# Patient Record
Sex: MALE | Race: WHITE | Employment: OTHER | URBAN - NONMETROPOLITAN AREA
[De-identification: names, ages, dates, MRNs, and addresses within clinical notes are randomized per-mention and may not be internally consistent; named-entity substitution may affect disease eponyms.]

---

## 2017-10-18 ENCOUNTER — HOSPITAL ENCOUNTER (EMERGENCY)
Age: 62
Discharge: HOME | End: 2017-10-18
Attending: EMERGENCY MEDICINE
Payer: MEDICARE

## 2017-10-18 VITALS
HEART RATE: 71 BPM | RESPIRATION RATE: 18 BRPM | BODY MASS INDEX: 24.33 KG/M2 | TEMPERATURE: 98.8 F | OXYGEN SATURATION: 97 % | SYSTOLIC BLOOD PRESSURE: 182 MMHG | WEIGHT: 160 LBS | DIASTOLIC BLOOD PRESSURE: 89 MMHG

## 2017-10-18 DIAGNOSIS — M54.50 ACUTE EXACERBATION OF CHRONIC LOW BACK PAIN: Primary | ICD-10-CM

## 2017-10-18 DIAGNOSIS — G89.29 ACUTE EXACERBATION OF CHRONIC LOW BACK PAIN: Primary | ICD-10-CM

## 2017-10-18 PROCEDURE — 96372 THER/PROPH/DIAG INJ SC/IM: CPT

## 2017-10-18 PROCEDURE — 6360000002 HC RX W HCPCS: Performed by: EMERGENCY MEDICINE

## 2017-10-18 PROCEDURE — 99282 EMERGENCY DEPT VISIT SF MDM: CPT

## 2017-10-18 RX ORDER — IBUPROFEN 200 MG
200 TABLET ORAL EVERY 8 HOURS PRN
Qty: 30 TABLET | Refills: 0 | Status: SHIPPED | OUTPATIENT
Start: 2017-10-18 | End: 2022-09-04

## 2017-10-18 RX ORDER — CYCLOBENZAPRINE HCL 10 MG
10 TABLET ORAL 3 TIMES DAILY PRN
Qty: 15 TABLET | Refills: 0 | Status: SHIPPED | OUTPATIENT
Start: 2017-10-18 | End: 2017-10-28

## 2017-10-18 RX ORDER — ORPHENADRINE CITRATE 30 MG/ML
60 INJECTION INTRAMUSCULAR; INTRAVENOUS ONCE
Status: COMPLETED | OUTPATIENT
Start: 2017-10-18 | End: 2017-10-18

## 2017-10-18 RX ORDER — KETOROLAC TROMETHAMINE 30 MG/ML
30 INJECTION, SOLUTION INTRAMUSCULAR; INTRAVENOUS ONCE
Status: COMPLETED | OUTPATIENT
Start: 2017-10-18 | End: 2017-10-18

## 2017-10-18 RX ADMIN — ORPHENADRINE CITRATE 60 MG: 30 INJECTION INTRAMUSCULAR; INTRAVENOUS at 17:37

## 2017-10-18 RX ADMIN — KETOROLAC TROMETHAMINE 30 MG: 30 INJECTION, SOLUTION INTRAMUSCULAR at 17:37

## 2017-10-18 ASSESSMENT — PAIN DESCRIPTION - ORIENTATION
ORIENTATION: LOWER
ORIENTATION: LOWER

## 2017-10-18 ASSESSMENT — ENCOUNTER SYMPTOMS
ABDOMINAL PAIN: 0
BACK PAIN: 1
VOMITING: 0
SHORTNESS OF BREATH: 0
NAUSEA: 0

## 2017-10-18 ASSESSMENT — PAIN DESCRIPTION - PROGRESSION
CLINICAL_PROGRESSION: GRADUALLY IMPROVING
CLINICAL_PROGRESSION: RAPIDLY WORSENING

## 2017-10-18 ASSESSMENT — PAIN - FUNCTIONAL ASSESSMENT: PAIN_FUNCTIONAL_ASSESSMENT: 0-10

## 2017-10-18 ASSESSMENT — PAIN SCALES - GENERAL
PAINLEVEL_OUTOF10: 10
PAINLEVEL_OUTOF10: 8

## 2017-10-18 ASSESSMENT — PAIN DESCRIPTION - ONSET
ONSET: ON-GOING
ONSET: ON-GOING

## 2017-10-18 ASSESSMENT — PAIN DESCRIPTION - LOCATION
LOCATION: BACK
LOCATION: BACK

## 2017-10-18 ASSESSMENT — PAIN DESCRIPTION - FREQUENCY
FREQUENCY: CONTINUOUS
FREQUENCY: CONTINUOUS

## 2017-10-18 ASSESSMENT — PAIN DESCRIPTION - PAIN TYPE
TYPE: CHRONIC PAIN
TYPE: CHRONIC PAIN

## 2017-10-18 ASSESSMENT — PAIN DESCRIPTION - DESCRIPTORS
DESCRIPTORS: STABBING
DESCRIPTORS: STABBING

## 2017-10-18 NOTE — ED PROVIDER NOTES
[address]  Peter Astudillo.  Phone: [phone]  eMERGENCY dEPARTMENT eNCOUnter      Pt Name: Milind Rodríguez  MRN: [id number]  Briellegfmarie 1955  Date of evaluation: 10/18/17      CHIEF COMPLAINT       Chief Complaint   Patient presents with    Back Pain     chronic lower back pain, was working in yard that made it worse         HISTORY OF PRESENT ILLNESS    Milind Rodríguez is a 63 y.o. male who presents Today with exacerbation of chronic lower back pain. The patient states that his pain waxes and wanes. He denies any recent trauma however in the 1990s he did have a lifting injury and most of his back pain is that have been covered by workmen's comp. He states that he has seen multiple spinal surgeons and he finally found one who is agreeable to operate and that his workmen's comp denied coverage. He also states that he is having difficulty with his . It's like a ghost reports that he has numbness in the right foot but this is been there for several months even before he saw her most recent surgeon. He denies chest pain or shortness of breath. No associated abdominal pain. Denies any incontinence of bowel or bladder function or saddle paresthesia however he states that he has difficulty twisting. Denies a history of IV drug use or any recent tattoos. REVIEW OF SYSTEMS     Review of Systems   Constitutional: Negative for fever. HENT: Negative for congestion. Respiratory: Negative for shortness of breath. Cardiovascular: Negative for chest pain. Gastrointestinal: Negative for abdominal pain, nausea and vomiting. Genitourinary: Negative for difficulty urinating. Musculoskeletal: Positive for back pain. Skin: Negative for rash. Neurological: Positive for numbness. Negative for weakness. Psychiatric/Behavioral: Negative for confusion. All other systems reviewed and are negative.         PAST MEDICAL HISTORY    has a past medical history of Asthma; Chronic back pain; DDD (degenerative disc disease); Left knee pain; Lumbago; Lumbar disc displacement without myelopathy; and Thoracic or lumbosacral neuritis or radiculitis, unspecified. SURGICAL HISTORY      has a past surgical history that includes Total knee arthroplasty (Left, 2009) and Vasectomy. CURRENT MEDICATIONS       Discharge Medication List as of 10/18/2017  5:55 PM      CONTINUE these medications which have NOT CHANGED    Details   tiotropium (SPIRIVA) 18 MCG inhalation capsule Inhale 18 mcg into the lungs daily. budesonide-formoterol (SYMBICORT) 80-4.5 MCG/ACT AERO Inhale 2 puffs into the lungs 2 times daily. cephALEXin (KEFLEX) 500 MG capsule Historical Med      sulfamethoxazole-trimethoprim (BACTRIM DS) 800-160 MG per tablet Historical Med      clonazePAM (KLONOPIN) 0.5 MG tablet Take 1 tablet by mouth 2 times daily as needed, Disp-30 tablet, R-0      nicotine (NICODERM CQ) 21 MG/24HR Place 1 patch onto the skin, Disp-30 patch, R-0      citalopram (CELEXA) 10 MG tablet Take 1 tablet by mouth daily, Disp-30 tablet, R-0      atorvastatin (LIPITOR) 40 MG tablet Take 1 tablet by mouth nightly, Disp-30 tablet, R-0      gabapentin (NEURONTIN) 300 MG capsule Take 1 capsule by mouth 3 times daily. , Disp-90 capsule, R-3      albuterol (PROVENTIL) (2.5 MG/3ML) 0.083% nebulizer solution Take 2.5 mg by nebulization every 6 hours as needed for Wheezing. ALLERGIES     is allergic to zipsor [diclofenac potassium]. FAMILY HISTORY     has no family status information on file. family history is not on file. SOCIAL HISTORY      reports that he quit smoking about 3 years ago. His smoking use included Cigarettes. He has never used smokeless tobacco. He reports that he does not drink alcohol or use drugs. PHYSICAL EXAM     INITIAL VITALS:  weight is 160 lb (72.6 kg). His tympanic temperature is 98.8 °F (37.1 °C).  His blood pressure is 182/89 (abnormal) and his pulse is disc protrusion, and right paramedian L4-L5 disc protrusion with associated synovial cyst or fluid in the right facet ligamentous. As the patient is 63years old and has mild tenderness in the axilla was recommended. I explained) to look for fracture or bony abnormality and that MRI is not very sensitive to look at the bones. The risks and benefits were discussed the patient declined the x-ray. He was agreeable to injections of Toradol and Norflex. No history of diabetes or kidney problems. The patient was directed to continue over-the-counter ibuprofen unless otherwise started by his doctor and was educated on the warning signs which return to the emergency department. DIAGNOSTIC RESULTS     EKG: All EKG's are interpreted by the Emergency Department Physician who either signs or Co-signs this chart in the absence of a cardiologist.        RADIOLOGY:   I directly visualized the following plain film images and reviewed the radiologist interpretations of radiologic studies:    No orders to display        No results found. LABS:  No results found for this visit on 10/18/17. EMERGENCY DEPARTMENT COURSE:   Vitals:    Vitals:    10/18/17 1701 10/18/17 1802   BP: (!) 157/91 (!) 182/89   Pulse: 71    Resp: 18    Temp: 98.8 °F (37.1 °C)    TempSrc: Tympanic    SpO2: 97%    Weight: 160 lb (72.6 kg)      -------------------------  BP: (!) 182/89, Temp: 98.8 °F (37.1 °C), Pulse: 71, Resp: 18      CONSULTS:  None    PROCEDURES:  None    FINAL IMPRESSION      1.  Acute exacerbation of chronic low back pain          DISPOSITION/PLAN   DISPOSITION Decision to Discharge    PATIENT REFERRED TO:  Frank Nieto DO  [address] [facility] [id number] [phone]    In 2 days        DISCHARGE MEDICATIONS:  Discharge Medication List as of 10/18/2017  5:55 PM      START taking these medications    Details   cyclobenzaprine (FLEXERIL) 10 MG tablet Take 1 tablet by mouth 3 times daily as needed for Muscle spasms, Disp-15 tablet, R-0Print             (Please note that portions of this note were completed with a voice recognition program.  Efforts were made to edit the dictations but occasionally words are mis-transcribed.)    Vaughn Robertson MD, [address] [facility],[address]  Attending Emergency Medicine Physician        Vaughn Robertson MD  10/18/17 9838

## 2019-08-28 ENCOUNTER — HOSPITAL ENCOUNTER (EMERGENCY)
Age: 64
Discharge: HOME | End: 2019-08-29
Attending: EMERGENCY MEDICINE
Payer: MEDICARE

## 2019-08-28 ENCOUNTER — APPOINTMENT (OUTPATIENT)
Dept: CT IMAGING | Age: 64
End: 2019-08-28
Payer: MEDICARE

## 2019-08-28 DIAGNOSIS — T68.XXXA HYPOTHERMIA, INITIAL ENCOUNTER: ICD-10-CM

## 2019-08-28 DIAGNOSIS — R41.82 ALTERED MENTAL STATUS, UNSPECIFIED ALTERED MENTAL STATUS TYPE: Primary | ICD-10-CM

## 2019-08-28 DIAGNOSIS — T40.601A NARCOTIC OVERDOSE, ACCIDENTAL OR UNINTENTIONAL, INITIAL ENCOUNTER (HCC): ICD-10-CM

## 2019-08-28 DIAGNOSIS — R09.02 HYPOXIA: ICD-10-CM

## 2019-08-28 LAB
ABSOLUTE EOS #: 0.1 K/UL (ref 0–0.4)
ABSOLUTE IMMATURE GRANULOCYTE: ABNORMAL K/UL (ref 0–0.3)
ABSOLUTE LYMPH #: 1.4 K/UL (ref 1–4.8)
ABSOLUTE MONO #: 1 K/UL (ref 0.1–1.2)
ALBUMIN SERPL-MCNC: 5 G/DL (ref 3.5–5.2)
ALBUMIN/GLOBULIN RATIO: 1.6 (ref 1–2.5)
ALP BLD-CCNC: 133 U/L (ref 40–129)
ALT SERPL-CCNC: 27 U/L (ref 5–41)
ANION GAP SERPL CALCULATED.3IONS-SCNC: 11 MMOL/L (ref 9–17)
AST SERPL-CCNC: 20 U/L
BASOPHILS # BLD: 0 % (ref 0–2)
BASOPHILS ABSOLUTE: 0 K/UL (ref 0–0.2)
BILIRUB SERPL-MCNC: 0.57 MG/DL (ref 0.3–1.2)
BUN BLDV-MCNC: 19 MG/DL (ref 8–23)
BUN/CREAT BLD: 18 (ref 9–20)
CALCIUM SERPL-MCNC: 9.8 MG/DL (ref 8.6–10.4)
CHLORIDE BLD-SCNC: 97 MMOL/L (ref 98–107)
CO2: 31 MMOL/L (ref 20–31)
CREAT SERPL-MCNC: 1.07 MG/DL (ref 0.7–1.2)
DIFFERENTIAL TYPE: ABNORMAL
EOSINOPHILS RELATIVE PERCENT: 1 % (ref 1–8)
GFR AFRICAN AMERICAN: >60 ML/MIN
GFR NON-AFRICAN AMERICAN: >60 ML/MIN
GFR SERPL CREATININE-BSD FRML MDRD: ABNORMAL ML/MIN/{1.73_M2}
GFR SERPL CREATININE-BSD FRML MDRD: ABNORMAL ML/MIN/{1.73_M2}
GLUCOSE BLD-MCNC: 248 MG/DL (ref 70–99)
HCT VFR BLD CALC: 53.1 % (ref 41–53)
HEMOGLOBIN: 17.6 G/DL (ref 13.5–17.5)
IMMATURE GRANULOCYTES: ABNORMAL %
LACTIC ACID: 1.3 MMOL/L (ref 0.5–2.2)
LIPASE: 29 U/L (ref 13–60)
LYMPHOCYTES # BLD: 11 % (ref 15–43)
MCH RBC QN AUTO: 30.2 PG (ref 26–34)
MCHC RBC AUTO-ENTMCNC: 33.2 G/DL (ref 31–37)
MCV RBC AUTO: 90.8 FL (ref 80–100)
MONOCYTES # BLD: 8 % (ref 6–14)
NRBC AUTOMATED: ABNORMAL PER 100 WBC
PDW BLD-RTO: 13.6 % (ref 11–14.5)
PLATELET # BLD: 263 K/UL (ref 140–450)
PLATELET ESTIMATE: ABNORMAL
PMV BLD AUTO: 8.6 FL (ref 6–12)
POTASSIUM SERPL-SCNC: 4 MMOL/L (ref 3.7–5.3)
RBC # BLD: 5.84 M/UL (ref 4.5–5.9)
RBC # BLD: ABNORMAL 10*6/UL
SEG NEUTROPHILS: 80 % (ref 44–74)
SEGMENTED NEUTROPHILS ABSOLUTE COUNT: 10.4 K/UL (ref 1.8–7.7)
SODIUM BLD-SCNC: 139 MMOL/L (ref 135–144)
TOTAL PROTEIN: 8.2 G/DL (ref 6.4–8.3)
TROPONIN INTERP: NORMAL
TROPONIN T: NORMAL NG/ML
TROPONIN, HIGH SENSITIVITY: 14 NG/L (ref 0–22)
WBC # BLD: 12.9 K/UL (ref 3.5–11)
WBC # BLD: ABNORMAL 10*3/UL

## 2019-08-28 PROCEDURE — 83690 ASSAY OF LIPASE: CPT

## 2019-08-28 PROCEDURE — 6360000004 HC RX CONTRAST MEDICATION: Performed by: EMERGENCY MEDICINE

## 2019-08-28 PROCEDURE — 2580000003 HC RX 258: Performed by: EMERGENCY MEDICINE

## 2019-08-28 PROCEDURE — 96374 THER/PROPH/DIAG INJ IV PUSH: CPT

## 2019-08-28 PROCEDURE — 96375 TX/PRO/DX INJ NEW DRUG ADDON: CPT

## 2019-08-28 PROCEDURE — 99284 EMERGENCY DEPT VISIT MOD MDM: CPT

## 2019-08-28 PROCEDURE — 36415 COLL VENOUS BLD VENIPUNCTURE: CPT

## 2019-08-28 PROCEDURE — 71275 CT ANGIOGRAPHY CHEST: CPT

## 2019-08-28 PROCEDURE — 83605 ASSAY OF LACTIC ACID: CPT

## 2019-08-28 PROCEDURE — 80053 COMPREHEN METABOLIC PANEL: CPT

## 2019-08-28 PROCEDURE — 82140 ASSAY OF AMMONIA: CPT

## 2019-08-28 PROCEDURE — 80307 DRUG TEST PRSMV CHEM ANLYZR: CPT

## 2019-08-28 PROCEDURE — G0480 DRUG TEST DEF 1-7 CLASSES: HCPCS

## 2019-08-28 PROCEDURE — 6360000002 HC RX W HCPCS: Performed by: EMERGENCY MEDICINE

## 2019-08-28 PROCEDURE — 93005 ELECTROCARDIOGRAM TRACING: CPT | Performed by: EMERGENCY MEDICINE

## 2019-08-28 PROCEDURE — 84484 ASSAY OF TROPONIN QUANT: CPT

## 2019-08-28 PROCEDURE — 85025 COMPLETE CBC W/AUTO DIFF WBC: CPT

## 2019-08-28 RX ORDER — FENTANYL CITRATE 50 UG/ML
25 INJECTION, SOLUTION INTRAMUSCULAR; INTRAVENOUS ONCE
Status: DISCONTINUED | OUTPATIENT
Start: 2019-08-28 | End: 2019-08-29 | Stop reason: HOSPADM

## 2019-08-28 RX ORDER — ONDANSETRON 2 MG/ML
4 INJECTION INTRAMUSCULAR; INTRAVENOUS ONCE
Status: COMPLETED | OUTPATIENT
Start: 2019-08-28 | End: 2019-08-28

## 2019-08-28 RX ORDER — 0.9 % SODIUM CHLORIDE 0.9 %
1000 INTRAVENOUS SOLUTION INTRAVENOUS ONCE
Status: COMPLETED | OUTPATIENT
Start: 2019-08-28 | End: 2019-08-28

## 2019-08-28 RX ORDER — NALOXONE HYDROCHLORIDE 1 MG/ML
0.4 INJECTION INTRAMUSCULAR; INTRAVENOUS; SUBCUTANEOUS ONCE
Status: COMPLETED | OUTPATIENT
Start: 2019-08-28 | End: 2019-08-28

## 2019-08-28 RX ADMIN — NALOXONE HYDROCHLORIDE 0.4 MG: 1 INJECTION PARENTERAL at 23:14

## 2019-08-28 RX ADMIN — IOPAMIDOL 100 ML: 755 INJECTION, SOLUTION INTRAVENOUS at 22:34

## 2019-08-28 RX ADMIN — SODIUM CHLORIDE 1000 ML: 9 INJECTION, SOLUTION INTRAVENOUS at 22:05

## 2019-08-28 RX ADMIN — ONDANSETRON 4 MG: 2 INJECTION INTRAMUSCULAR; INTRAVENOUS at 22:05

## 2019-08-28 ASSESSMENT — PAIN DESCRIPTION - PAIN TYPE: TYPE: ACUTE PAIN

## 2019-08-28 ASSESSMENT — PAIN DESCRIPTION - PROGRESSION: CLINICAL_PROGRESSION: NOT CHANGED

## 2019-08-28 ASSESSMENT — PAIN DESCRIPTION - DESCRIPTORS: DESCRIPTORS: ACHING

## 2019-08-28 ASSESSMENT — PAIN DESCRIPTION - ONSET: ONSET: ON-GOING

## 2019-08-28 ASSESSMENT — PAIN DESCRIPTION - LOCATION: LOCATION: ABDOMEN

## 2019-08-28 ASSESSMENT — PAIN SCALES - GENERAL
PAINLEVEL_OUTOF10: 5
PAINLEVEL_OUTOF10: 5

## 2019-08-28 ASSESSMENT — PAIN DESCRIPTION - FREQUENCY: FREQUENCY: CONTINUOUS

## 2019-08-28 ASSESSMENT — PAIN DESCRIPTION - ORIENTATION: ORIENTATION: RIGHT;LEFT

## 2019-08-28 ASSESSMENT — PAIN - FUNCTIONAL ASSESSMENT: PAIN_FUNCTIONAL_ASSESSMENT: PREVENTS OR INTERFERES SOME ACTIVE ACTIVITIES AND ADLS

## 2019-08-29 VITALS
BODY MASS INDEX: 24.33 KG/M2 | OXYGEN SATURATION: 88 % | RESPIRATION RATE: 12 BRPM | SYSTOLIC BLOOD PRESSURE: 151 MMHG | WEIGHT: 160 LBS | HEART RATE: 75 BPM | TEMPERATURE: 95 F | DIASTOLIC BLOOD PRESSURE: 87 MMHG

## 2019-08-29 LAB
ACETAMINOPHEN LEVEL: <5 UG/ML (ref 10–30)
AMMONIA: 66 UMOL/L (ref 16–60)
EKG ATRIAL RATE: 66 BPM
EKG P AXIS: 12 DEGREES
EKG P-R INTERVAL: 172 MS
EKG Q-T INTERVAL: 430 MS
EKG QRS DURATION: 102 MS
EKG QTC CALCULATION (BAZETT): 450 MS
EKG R AXIS: -21 DEGREES
EKG T AXIS: 37 DEGREES
EKG VENTRICULAR RATE: 66 BPM
ETHANOL PERCENT: ABNORMAL %
ETHANOL: <10 MG/DL
LACTIC ACID: 2.4 MMOL/L (ref 0.5–2.2)
SALICYLATE LEVEL: <1 MG/DL (ref 3–10)
TOXIC TRICYCLIC SC,BLOOD: NEGATIVE
TROPONIN INTERP: NORMAL
TROPONIN T: NORMAL NG/ML
TROPONIN, HIGH SENSITIVITY: 12 NG/L (ref 0–22)

## 2019-08-29 PROCEDURE — 96376 TX/PRO/DX INJ SAME DRUG ADON: CPT

## 2019-08-29 PROCEDURE — 6360000002 HC RX W HCPCS: Performed by: EMERGENCY MEDICINE

## 2019-08-29 RX ORDER — NALOXONE HYDROCHLORIDE 1 MG/ML
0.4 INJECTION INTRAMUSCULAR; INTRAVENOUS; SUBCUTANEOUS ONCE
Status: COMPLETED | OUTPATIENT
Start: 2019-08-29 | End: 2019-08-29

## 2019-08-29 RX ADMIN — NALOXONE HYDROCHLORIDE 0.4 MG: 1 INJECTION PARENTERAL at 01:28

## 2019-08-29 NOTE — ED PROVIDER NOTES
prolonged hematuria needs to be evaluated. He is unable to provide a urine specimen while in the emergency department. He declines catheterization. I have described this presentation could be an indication of malignancy such as a bladder wall cancer and should be further evaluated. Oxygen saturation prior to discharge between 83 and 91%. I have explained that to him prolonged hypoxia can cause irreversible brain damage. Patient and family members expressed understanding. I have reviewed the disposition diagnosis with the patient and or their family/guardian. I have answered their questions and givendischarge instructions. They voiced understanding of these instructions and did not have any further questions or complaints. DIAGNOSTIC RESULTS     EKG: All EKG's are interpreted by the Emergency Department Physician who either signs or Co-signs this chart inthe absence of a cardiologist.    Twelve-lead EKG in the emergency department shows normal sinus rhythm at 66 bpm.  Normal intervals and axis. No acute ST elevations depressions or T-wave inversions dictation is a nonacute twelve-lead EKG. RADIOLOGY:   I directly visualized the following plain film images and reviewed the radiologistinterpretations of radiologic studies:    Cta Chest Abdomen Pelvis W Contrast    Result Date: 8/28/2019  EXAMINATION: CTA OF THE CHEST, ABDOMEN AND PELVIS WITH CONTRAST, 8/28/2019 10:40 pm TECHNIQUE: CTA of the chest, abdomen and pelvis was performed after the administration of intravenous contrast.  Multiplanar reformatted images are provided for review. MIP images are provided for review. Dose modulation, iterative reconstruction, and/or weight based adjustment of the mA/kV was utilized to reduce the radiation dose to as low as reasonably achievable.  COMPARISON: Chest x-ray 01/09/2014 HISTORY: ORDERING SYSTEM PROVIDED HISTORY: chest pain / abd pain TECHNOLOGIST PROVIDED HISTORY: Reason for Exam: complains of pain mmol/L   Troponin   Result Value Ref Range    Troponin, High Sensitivity 14 0 - 22 ng/L    Troponin T NOT REPORTED <0.03 ng/mL    Troponin Interp NOT REPORTED    TOX SCR, BLD, ED   Result Value Ref Range    Ethanol <10 <10 mg/dL    Ethanol percent NOT REPORTED %    Salicylate Lvl <1 (L) 3 - 10 mg/dL    Acetaminophen Level <5 (L) 10 - 30 ug/mL    Toxic Tricyclic Sc,Blood PENDING NEGATIVE   Lactic Acid   Result Value Ref Range    Lactic Acid 2.4 (H) 0.5 - 2.2 mmol/L   Ammonia   Result Value Ref Range    Ammonia 66 (H) 16 - 60 umol/L   Troponin   Result Value Ref Range    Troponin, High Sensitivity 12 0 - 22 ng/L    Troponin T NOT REPORTED <0.03 ng/mL    Troponin Interp NOT REPORTED    EKG 12 Lead   Result Value Ref Range    Ventricular Rate 66 BPM    Atrial Rate 66 BPM    P-R Interval 172 ms    QRS Duration 102 ms    Q-T Interval 430 ms    QTc Calculation (Bazett) 450 ms    P Axis 12 degrees    R Axis -21 degrees    T Axis 37 degrees       EMERGENCY DEPARTMENT COURSE:   Vitals:    Vitals:    08/28/19 2335 08/29/19 0004 08/29/19 0034 08/29/19 0105   BP: (!) 139/91 (!) 142/94 129/76 (!) 151/87   Pulse: 80 69 78 75   Resp: 19 14 13 12   Temp:       TempSrc:       SpO2: 97% 95% 94% (!) 88%   Weight:         -------------------------  BP: (!) 151/87, Temp: 95 °F (35 °C), Pulse: 75, Resp: 12      CONSULTS:  None    PROCEDURES:  None    FINAL IMPRESSION      1. Altered mental status, unspecified altered mental status type    2. Narcotic overdose, accidental or unintentional, initial encounter ([facility] [address]    3. Hypoxia    4.  Hypothermia, initial encounter          DISPOSITION/PLAN   DISPOSITION [place] 08/29/2019 12:16:43 AM      PATIENT REFERRED TO:  Aide Mahoney MD  [address] [place] [address],[address]  [phone]    In 2 days        DISCHARGE MEDICATIONS:  Discharge Medication List as of 8/29/2019  1:24 AM          Controlled Substances Monitoring:          The [facility] Reporting [facility]) report was reviewed on this patient by myself on this encounter. The generated report indicated that the patient received:    No controlled substances. I shared and reviewed this report with the patient. I gently expressed my concerns for the patients safety regarding unsafe and inappropriate use ofnarcotic controlled substance prescriptions. I gently expressed that I was uncomforatable in prescribing any additional narcotic controlled substances for this reason. I expressed that it is good medical practice, and inthe best interest of patient safety, that patients obtain prescriptions from a single physician for chronic pain conditions. The patient expressed their understanding of our discussion. There are no active hospital problems to display for this patient.       (Please note that portions of this note were completed with [employer] recognition program.  Efforts were made to edit the dictations but occasionally words are mis-transcribed.)    Tarik Guerra MD, [address] [facility],[address]  Attending Emergency Medicine Physician        Tarik Guerra MD  08/29/19 7857       Tarik Guerra MD  08/29/19 Karissa Lozada MD  08/29/19 0454

## 2019-11-20 ENCOUNTER — APPOINTMENT (OUTPATIENT)
Dept: GENERAL RADIOLOGY | Age: 64
End: 2019-11-20
Payer: MEDICARE

## 2019-11-20 ENCOUNTER — APPOINTMENT (OUTPATIENT)
Dept: CT IMAGING | Age: 64
End: 2019-11-20
Payer: MEDICARE

## 2019-11-20 ENCOUNTER — HOSPITAL ENCOUNTER (EMERGENCY)
Age: 64
Discharge: HOME | End: 2019-11-20
Attending: EMERGENCY MEDICINE
Payer: MEDICARE

## 2019-11-20 VITALS
OXYGEN SATURATION: 98 % | BODY MASS INDEX: 24.33 KG/M2 | RESPIRATION RATE: 16 BRPM | DIASTOLIC BLOOD PRESSURE: 86 MMHG | HEART RATE: 71 BPM | SYSTOLIC BLOOD PRESSURE: 121 MMHG | TEMPERATURE: 97.3 F | WEIGHT: 160 LBS

## 2019-11-20 DIAGNOSIS — R73.9 HYPERGLYCEMIA: ICD-10-CM

## 2019-11-20 DIAGNOSIS — R82.5 POSITIVE URINE DRUG SCREEN: ICD-10-CM

## 2019-11-20 DIAGNOSIS — J01.00 ACUTE MAXILLARY SINUSITIS, RECURRENCE NOT SPECIFIED: ICD-10-CM

## 2019-11-20 DIAGNOSIS — M79.605 LEFT LEG PAIN: Primary | ICD-10-CM

## 2019-11-20 LAB
-: ABNORMAL
ABSOLUTE EOS #: 0.2 K/UL (ref 0–0.4)
ABSOLUTE IMMATURE GRANULOCYTE: ABNORMAL K/UL (ref 0–0.3)
ABSOLUTE LYMPH #: 1.9 K/UL (ref 1–4.8)
ABSOLUTE MONO #: 1.3 K/UL (ref 0.1–1.2)
AMORPHOUS: ABNORMAL
AMPHETAMINE SCREEN URINE: POSITIVE
ANION GAP SERPL CALCULATED.3IONS-SCNC: 13 MMOL/L (ref 9–17)
BACTERIA: ABNORMAL
BARBITURATE SCREEN URINE: NEGATIVE
BASOPHILS # BLD: 0 % (ref 0–2)
BASOPHILS ABSOLUTE: 0 K/UL (ref 0–0.2)
BENZODIAZEPINE SCREEN, URINE: POSITIVE
BILIRUBIN URINE: NEGATIVE
BUN BLDV-MCNC: 12 MG/DL (ref 8–23)
BUN/CREAT BLD: 15 (ref 9–20)
BUPRENORPHINE URINE: NEGATIVE
CALCIUM SERPL-MCNC: 9 MG/DL (ref 8.6–10.4)
CANNABINOID SCREEN URINE: NEGATIVE
CASTS UA: ABNORMAL /LPF (ref 0–2)
CASTS UA: ABNORMAL /LPF (ref 0–2)
CHLORIDE BLD-SCNC: 92 MMOL/L (ref 98–107)
CO2: 27 MMOL/L (ref 20–31)
COCAINE METABOLITE, URINE: NEGATIVE
COLOR: ABNORMAL
COMMENT UA: ABNORMAL
CREAT SERPL-MCNC: 0.78 MG/DL (ref 0.7–1.2)
CRYSTALS, UA: ABNORMAL /HPF
DIFFERENTIAL TYPE: ABNORMAL
EOSINOPHILS RELATIVE PERCENT: 1 % (ref 1–8)
EPITHELIAL CELLS UA: ABNORMAL /HPF (ref 0–5)
ETHANOL PERCENT: NORMAL %
ETHANOL: <10 MG/DL
GFR AFRICAN AMERICAN: >60 ML/MIN
GFR NON-AFRICAN AMERICAN: >60 ML/MIN
GFR SERPL CREATININE-BSD FRML MDRD: ABNORMAL ML/MIN/{1.73_M2}
GFR SERPL CREATININE-BSD FRML MDRD: ABNORMAL ML/MIN/{1.73_M2}
GLUCOSE BLD-MCNC: 311 MG/DL (ref 70–99)
GLUCOSE URINE: ABNORMAL
HCT VFR BLD CALC: 48.1 % (ref 41–53)
HEMOGLOBIN: 16.2 G/DL (ref 13.5–17.5)
IMMATURE GRANULOCYTES: ABNORMAL %
KETONES, URINE: NEGATIVE
LEUKOCYTE ESTERASE, URINE: NEGATIVE
LYMPHOCYTES # BLD: 14 % (ref 15–43)
MCH RBC QN AUTO: 30.5 PG (ref 26–34)
MCHC RBC AUTO-ENTMCNC: 33.8 G/DL (ref 31–37)
MCV RBC AUTO: 90.4 FL (ref 80–100)
MDMA URINE: ABNORMAL
METHADONE SCREEN, URINE: POSITIVE
METHAMPHETAMINE, URINE: POSITIVE
MONOCYTES # BLD: 10 % (ref 6–14)
MUCUS: ABNORMAL
MYOGLOBIN: 40 NG/ML (ref 28–72)
NITRITE, URINE: NEGATIVE
NRBC AUTOMATED: ABNORMAL PER 100 WBC
OPIATES, URINE: NEGATIVE
OTHER OBSERVATIONS UA: ABNORMAL
OXYCODONE SCREEN URINE: NEGATIVE
PDW BLD-RTO: 13.3 % (ref 11–14.5)
PH UA: 5.5 (ref 5–6)
PHENCYCLIDINE, URINE: NEGATIVE
PLATELET # BLD: 224 K/UL (ref 140–450)
PLATELET ESTIMATE: ABNORMAL
PMV BLD AUTO: 9 FL (ref 6–12)
POTASSIUM SERPL-SCNC: 3.7 MMOL/L (ref 3.7–5.3)
PROPOXYPHENE, URINE: NEGATIVE
PROTEIN UA: NEGATIVE
RBC # BLD: 5.32 M/UL (ref 4.5–5.9)
RBC # BLD: ABNORMAL 10*6/UL
RBC UA: ABNORMAL /HPF (ref 0–4)
RENAL EPITHELIAL, UA: ABNORMAL /HPF
SEG NEUTROPHILS: 75 % (ref 44–74)
SEGMENTED NEUTROPHILS ABSOLUTE COUNT: 10 K/UL (ref 1.8–7.7)
SODIUM BLD-SCNC: 132 MMOL/L (ref 135–144)
SPECIFIC GRAVITY UA: 1.03 (ref 1.01–1.02)
TEST INFORMATION: ABNORMAL
TOTAL CK: 117 U/L (ref 39–308)
TRICHOMONAS: ABNORMAL
TRICYCLIC ANTIDEPRESSANTS, UR: NEGATIVE
TURBIDITY: ABNORMAL
URINE HGB: NEGATIVE
UROBILINOGEN, URINE: NORMAL
WBC # BLD: 13.3 K/UL (ref 3.5–11)
WBC # BLD: ABNORMAL 10*3/UL
WBC UA: ABNORMAL /HPF (ref 0–4)
YEAST: ABNORMAL

## 2019-11-20 PROCEDURE — 80048 BASIC METABOLIC PNL TOTAL CA: CPT

## 2019-11-20 PROCEDURE — 36415 COLL VENOUS BLD VENIPUNCTURE: CPT

## 2019-11-20 PROCEDURE — G0480 DRUG TEST DEF 1-7 CLASSES: HCPCS

## 2019-11-20 PROCEDURE — 70450 CT HEAD/BRAIN W/O DYE: CPT

## 2019-11-20 PROCEDURE — 72170 X-RAY EXAM OF PELVIS: CPT

## 2019-11-20 PROCEDURE — 73552 X-RAY EXAM OF FEMUR 2/>: CPT

## 2019-11-20 PROCEDURE — 99284 EMERGENCY DEPT VISIT MOD MDM: CPT

## 2019-11-20 PROCEDURE — 85025 COMPLETE CBC W/AUTO DIFF WBC: CPT

## 2019-11-20 PROCEDURE — 96372 THER/PROPH/DIAG INJ SC/IM: CPT

## 2019-11-20 PROCEDURE — 6360000002 HC RX W HCPCS: Performed by: EMERGENCY MEDICINE

## 2019-11-20 PROCEDURE — 80306 DRUG TEST PRSMV INSTRMNT: CPT

## 2019-11-20 PROCEDURE — 81001 URINALYSIS AUTO W/SCOPE: CPT

## 2019-11-20 PROCEDURE — 83874 ASSAY OF MYOGLOBIN: CPT

## 2019-11-20 PROCEDURE — 82550 ASSAY OF CK (CPK): CPT

## 2019-11-20 RX ORDER — DOXYCYCLINE 100 MG/1
100 TABLET ORAL 2 TIMES DAILY
Qty: 20 TABLET | Refills: 0 | Status: SHIPPED | OUTPATIENT
Start: 2019-11-20 | End: 2019-11-30

## 2019-11-20 RX ORDER — KETOROLAC TROMETHAMINE 30 MG/ML
30 INJECTION, SOLUTION INTRAMUSCULAR; INTRAVENOUS ONCE
Status: COMPLETED | OUTPATIENT
Start: 2019-11-20 | End: 2019-11-20

## 2019-11-20 RX ADMIN — KETOROLAC TROMETHAMINE 30 MG: 30 INJECTION, SOLUTION INTRAMUSCULAR at 21:32

## 2019-11-20 ASSESSMENT — PAIN SCALES - GENERAL
PAINLEVEL_OUTOF10: 10
PAINLEVEL_OUTOF10: 10

## 2019-11-20 ASSESSMENT — PAIN DESCRIPTION - ORIENTATION: ORIENTATION: LEFT;OUTER;UPPER

## 2019-11-20 ASSESSMENT — PAIN DESCRIPTION - LOCATION: LOCATION: LEG

## 2021-10-13 ENCOUNTER — HOSPITAL ENCOUNTER (EMERGENCY)
Age: 66
Discharge: HOME | End: 2021-10-14
Attending: EMERGENCY MEDICINE
Payer: MEDICARE

## 2021-10-13 ENCOUNTER — APPOINTMENT (OUTPATIENT)
Dept: CT IMAGING | Age: 66
End: 2021-10-13
Payer: MEDICARE

## 2021-10-13 DIAGNOSIS — U07.1 COVID-19: Primary | ICD-10-CM

## 2021-10-13 DIAGNOSIS — J20.8 ACUTE BRONCHITIS DUE TO OTHER SPECIFIED ORGANISMS: ICD-10-CM

## 2021-10-13 LAB
ABSOLUTE EOS #: <0.03 K/UL (ref 0–0.44)
ABSOLUTE IMMATURE GRANULOCYTE: 0.03 K/UL (ref 0–0.3)
ABSOLUTE LYMPH #: 1.08 K/UL (ref 1.1–3.7)
ABSOLUTE MONO #: 0.72 K/UL (ref 0.1–1.2)
ALBUMIN SERPL-MCNC: 4.1 G/DL (ref 3.5–5.2)
ALBUMIN/GLOBULIN RATIO: 1.2 (ref 1–2.5)
ALP BLD-CCNC: 188 U/L (ref 40–129)
ALT SERPL-CCNC: 22 U/L (ref 5–41)
ANION GAP SERPL CALCULATED.3IONS-SCNC: 10 MMOL/L (ref 9–17)
AST SERPL-CCNC: 26 U/L
BASOPHILS # BLD: 0 % (ref 0–2)
BASOPHILS ABSOLUTE: <0.03 K/UL (ref 0–0.2)
BILIRUB SERPL-MCNC: 0.38 MG/DL (ref 0.3–1.2)
BUN BLDV-MCNC: 11 MG/DL (ref 8–23)
BUN/CREAT BLD: 13 (ref 9–20)
CALCIUM SERPL-MCNC: 9.1 MG/DL (ref 8.6–10.4)
CHLORIDE BLD-SCNC: 93 MMOL/L (ref 98–107)
CO2: 28 MMOL/L (ref 20–31)
CREAT SERPL-MCNC: 0.85 MG/DL (ref 0.7–1.2)
D-DIMER QUANTITATIVE: 0.86 MG/L FEU (ref 0–0.59)
DIFFERENTIAL TYPE: ABNORMAL
EOSINOPHILS RELATIVE PERCENT: 0 % (ref 1–4)
GFR AFRICAN AMERICAN: >60 ML/MIN
GFR NON-AFRICAN AMERICAN: >60 ML/MIN
GFR SERPL CREATININE-BSD FRML MDRD: ABNORMAL ML/MIN/{1.73_M2}
GFR SERPL CREATININE-BSD FRML MDRD: ABNORMAL ML/MIN/{1.73_M2}
GLUCOSE BLD-MCNC: 157 MG/DL (ref 70–99)
HCT VFR BLD CALC: 49.2 % (ref 40.7–50.3)
HEMOGLOBIN: 16.4 G/DL (ref 13–17)
IMMATURE GRANULOCYTES: 1 %
LACTIC ACID, SEPSIS WHOLE BLOOD: NORMAL MMOL/L (ref 0.5–1.9)
LACTIC ACID, SEPSIS: 0.9 MMOL/L (ref 0.5–1.9)
LYMPHOCYTES # BLD: 20 % (ref 24–43)
MCH RBC QN AUTO: 29.2 PG (ref 25.2–33.5)
MCHC RBC AUTO-ENTMCNC: 33.3 G/DL (ref 25.2–33.5)
MCV RBC AUTO: 87.5 FL (ref 82.6–102.9)
MONOCYTES # BLD: 13 % (ref 3–12)
NRBC AUTOMATED: 0 PER 100 WBC
PDW BLD-RTO: 12.8 % (ref 11.8–14.4)
PLATELET # BLD: ABNORMAL K/UL (ref 138–453)
PLATELET ESTIMATE: ABNORMAL
PLATELET, FLUORESCENCE: 110 K/UL (ref 138–453)
PLATELET, IMMATURE FRACTION: 6.2 % (ref 1.1–10.3)
PMV BLD AUTO: ABNORMAL FL (ref 8.1–13.5)
POTASSIUM SERPL-SCNC: 4.1 MMOL/L (ref 3.7–5.3)
RBC # BLD: 5.62 M/UL (ref 4.21–5.77)
RBC # BLD: ABNORMAL 10*6/UL
SEG NEUTROPHILS: 66 % (ref 36–65)
SEGMENTED NEUTROPHILS ABSOLUTE COUNT: 3.52 K/UL (ref 1.5–8.1)
SODIUM BLD-SCNC: 131 MMOL/L (ref 135–144)
TOTAL PROTEIN: 7.5 G/DL (ref 6.4–8.3)
TROPONIN INTERP: NORMAL
TROPONIN T: NORMAL NG/ML
TROPONIN, HIGH SENSITIVITY: 14 NG/L (ref 0–22)
WBC # BLD: 5.4 K/UL (ref 3.5–11.3)
WBC # BLD: ABNORMAL 10*3/UL

## 2021-10-13 PROCEDURE — 84484 ASSAY OF TROPONIN QUANT: CPT

## 2021-10-13 PROCEDURE — 99285 EMERGENCY DEPT VISIT HI MDM: CPT

## 2021-10-13 PROCEDURE — 85055 RETICULATED PLATELET ASSAY: CPT

## 2021-10-13 PROCEDURE — 6370000000 HC RX 637 (ALT 250 FOR IP): Performed by: EMERGENCY MEDICINE

## 2021-10-13 PROCEDURE — 2580000003 HC RX 258: Performed by: EMERGENCY MEDICINE

## 2021-10-13 PROCEDURE — 80053 COMPREHEN METABOLIC PANEL: CPT

## 2021-10-13 PROCEDURE — 83605 ASSAY OF LACTIC ACID: CPT

## 2021-10-13 PROCEDURE — 85025 COMPLETE CBC W/AUTO DIFF WBC: CPT

## 2021-10-13 PROCEDURE — 6360000004 HC RX CONTRAST MEDICATION: Performed by: EMERGENCY MEDICINE

## 2021-10-13 PROCEDURE — 85379 FIBRIN DEGRADATION QUANT: CPT

## 2021-10-13 PROCEDURE — 94640 AIRWAY INHALATION TREATMENT: CPT

## 2021-10-13 PROCEDURE — 93005 ELECTROCARDIOGRAM TRACING: CPT | Performed by: EMERGENCY MEDICINE

## 2021-10-13 PROCEDURE — 71260 CT THORAX DX C+: CPT

## 2021-10-13 RX ORDER — 0.9 % SODIUM CHLORIDE 0.9 %
1000 INTRAVENOUS SOLUTION INTRAVENOUS ONCE
Status: COMPLETED | OUTPATIENT
Start: 2021-10-13 | End: 2021-10-14

## 2021-10-13 RX ORDER — ALBUTEROL SULFATE 90 UG/1
2 AEROSOL, METERED RESPIRATORY (INHALATION) ONCE
Status: COMPLETED | OUTPATIENT
Start: 2021-10-13 | End: 2021-10-13

## 2021-10-13 RX ADMIN — SODIUM CHLORIDE 1000 ML: 9 INJECTION, SOLUTION INTRAVENOUS at 23:39

## 2021-10-13 RX ADMIN — ALBUTEROL SULFATE 2 PUFF: 108 INHALANT RESPIRATORY (INHALATION) at 22:53

## 2021-10-13 RX ADMIN — IOPAMIDOL 80 ML: 755 INJECTION, SOLUTION INTRAVENOUS at 23:52

## 2021-10-13 ASSESSMENT — PAIN DESCRIPTION - LOCATION: LOCATION: GENERALIZED

## 2021-10-13 ASSESSMENT — PAIN DESCRIPTION - FREQUENCY: FREQUENCY: CONTINUOUS

## 2021-10-13 ASSESSMENT — PAIN SCALES - GENERAL: PAINLEVEL_OUTOF10: 6

## 2021-10-13 ASSESSMENT — PAIN DESCRIPTION - DESCRIPTORS: DESCRIPTORS: ACHING

## 2021-10-13 ASSESSMENT — PAIN DESCRIPTION - PAIN TYPE: TYPE: ACUTE PAIN

## 2021-10-14 VITALS
DIASTOLIC BLOOD PRESSURE: 81 MMHG | BODY MASS INDEX: 24.71 KG/M2 | HEIGHT: 68 IN | OXYGEN SATURATION: 95 % | TEMPERATURE: 99.3 F | RESPIRATION RATE: 20 BRPM | HEART RATE: 72 BPM | SYSTOLIC BLOOD PRESSURE: 112 MMHG | WEIGHT: 163 LBS

## 2021-10-14 PROCEDURE — 94640 AIRWAY INHALATION TREATMENT: CPT

## 2021-10-14 PROCEDURE — 6370000000 HC RX 637 (ALT 250 FOR IP): Performed by: EMERGENCY MEDICINE

## 2021-10-14 RX ORDER — ALBUTEROL SULFATE 90 UG/1
2 AEROSOL, METERED RESPIRATORY (INHALATION) ONCE
Status: COMPLETED | OUTPATIENT
Start: 2021-10-14 | End: 2021-10-14

## 2021-10-14 RX ORDER — PREDNISONE 20 MG/1
40 TABLET ORAL ONCE
Status: COMPLETED | OUTPATIENT
Start: 2021-10-14 | End: 2021-10-14

## 2021-10-14 RX ORDER — ALBUTEROL SULFATE 90 UG/1
2 AEROSOL, METERED RESPIRATORY (INHALATION) 4 TIMES DAILY PRN
Qty: 54 G | Refills: 1 | Status: SHIPPED | OUTPATIENT
Start: 2021-10-14

## 2021-10-14 RX ORDER — AZITHROMYCIN 250 MG/1
500 TABLET, FILM COATED ORAL ONCE
Status: COMPLETED | OUTPATIENT
Start: 2021-10-14 | End: 2021-10-14

## 2021-10-14 RX ORDER — PREDNISONE 10 MG/1
TABLET ORAL
Qty: 16 TABLET | Refills: 0 | Status: SHIPPED | OUTPATIENT
Start: 2021-10-14 | End: 2021-10-24

## 2021-10-14 RX ORDER — AZITHROMYCIN 250 MG/1
250 TABLET, FILM COATED ORAL DAILY
Qty: 4 TABLET | Refills: 0 | Status: SHIPPED | OUTPATIENT
Start: 2021-10-14 | End: 2021-10-18

## 2021-10-14 RX ADMIN — PREDNISONE 40 MG: 20 TABLET ORAL at 00:40

## 2021-10-14 RX ADMIN — AZITHROMYCIN 500 MG: 250 TABLET, FILM COATED ORAL at 00:40

## 2021-10-14 RX ADMIN — ALBUTEROL SULFATE 2 PUFF: 108 INHALANT RESPIRATORY (INHALATION) at 00:43

## 2021-10-14 ASSESSMENT — ENCOUNTER SYMPTOMS
SHORTNESS OF BREATH: 0
TROUBLE SWALLOWING: 0
VOMITING: 0

## 2021-10-14 NOTE — ED PROVIDER NOTES
[address] [facility]  [address]  [place] [address]  Phone: [phone]  eMERGENCY dEPARTMENT eNCOUnter      Pt Name: Meet Landin  MRN: [id number]  Anastacia 1955  Date of evaluation: 10/14/21      CHIEF COMPLAINT     Chief Complaint   Patient presents with    Positive For Covid-19    Generalized Body Aches       HISTORY OF PRESENT ILLNESS    Meet Landin is a 61 y.o. male who presents today for evaluation of generalized body weakness and feeling fatigued. Patient was diagnosed with Covid 5 days ago he started with symptoms 6 days ago. He has been taking acetaminophen and ibuprofen at home. He states that he is drinking fluids. No significant nausea vomiting. It appears that patient previously was on bronchodilators and inhaled steroids. The chart indicates he has a history of asthma it is possible he also has an underlying history of COPD. Patient states he has not taken his medications in some time. He states that he is not currently smoking. Does not have a pulse oximeter at home. Denies previous history of blood clot. Not currently short of breath. REVIEW OF SYSTEMS     Review of Systems   Constitutional: Positive for fatigue and fever. HENT: Negative for trouble swallowing. Respiratory: Negative for shortness of breath. Cardiovascular: Negative for chest pain. Gastrointestinal: Negative for vomiting. Genitourinary: Negative for dysuria. Musculoskeletal: Positive for myalgias. Skin: Negative for rash. Neurological: Negative for syncope. Psychiatric/Behavioral: Negative for confusion. All other systems reviewed and are negative. PAST MEDICAL HISTORY    has a past medical history of Asthma, Chronic back pain, DDD (degenerative disc disease), Left knee pain, Lumbago, Lumbar disc displacement without myelopathy, and Thoracic or lumbosacral neuritis or radiculitis, unspecified.     SURGICAL HISTORY      has a past surgical history that includes Total Mouth/Throat:      Mouth: Mucous membranes are dry. Eyes:      Extraocular Movements: Extraocular movements intact. Pupils: Pupils are equal, round, and reactive to light. Cardiovascular:      Rate and Rhythm: Normal rate and regular rhythm. Pulses: Normal pulses. Heart sounds: Normal heart sounds. Pulmonary:      Effort: Pulmonary effort is normal.      Breath sounds: Wheezing and rhonchi present. Abdominal:      Palpations: Abdomen is soft. Tenderness: There is no abdominal tenderness. There is no guarding or rebound. Musculoskeletal:         General: No swelling or tenderness. Normal range of motion. Cervical back: Neck supple. Skin:     General: Skin is warm and dry. Capillary Refill: Capillary refill takes less than 2 seconds. Findings: No rash. Neurological:      General: No focal deficit present. Mental Status: He is alert and oriented to person, place, and time. Cranial Nerves: No cranial nerve deficit. Sensory: No sensory deficit. Motor: No weakness. Psychiatric:         Mood and Affect: Mood normal.         Behavior: Behavior normal.       DIFFERENTIAL DIAGNOSIS / MDM / EMERGENCY DEPARTMENT COURSE:      Patient with known Covid and history of asthma also non-smoker not currently on bronchodilators. Patient with audible wheezing and isolated rhonchi. Patient had marginal oxygen saturation upon arrival to the emergency department in the low 90s occasionally dipping down to 89% prior to any treatments. Will work-up for pulmonary embolism. Patient is given bronchodilators which did improve his symptoms. Patient's oxygen saturation improved did not require oxygen therapy in the emergency department. Elevated D-dimer but subsequent CT negative for pulmonary embolism. Possibly evidence of bronchitis versus infiltrate. Given his history I will start him on a Z-[place] give him a burst of steroids and discharge him with an albuterol MDI. Patient did ambulate in the emergency department prior to discharge and he was not hypoxic. He was feeling improved with interventions. He also got a liter of IV fluids. Patient is discharged with a pulse oximeter so he can monitor this at home he was educated on the warning signs which return to the emergency department. He had no further questions or concerns. I have reviewed the disposition diagnosis with the patient and or their family/guardian. I have answered their questions and givendischarge instructions. They voiced understanding of these instructions and did not have any further questions or complaints. DIAGNOSTIC RESULTS     EKG: All EKG's are interpreted by the Emergency Department Physician who either signs or Co-signs this chart inthe absence of a cardiologist.    Twelve-lead EKG normal sinus rhythm at 71 bpm with normal intervals and left axis deviation. There is an isolated PVC no acute ST or T wave changes are appreciated interpretation is a nonacute twelve-lead EKG. RADIOLOGY:   Radiologist interpretation of radiologic studies:     CT CHEST PULMONARY EMBOLISM W CONTRAST    Result Date: 10/14/2021  EXAMINATION: CTA OF THE CHEST 10/13/2021 11:41 pm TECHNIQUE: CTA of the chest was performed after the administration of intravenous contrast.  Multiplanar reformatted images are provided for review. MIP images are provided for review. Dose modulation, iterative reconstruction, and/or weight based adjustment of the mA/kV was utilized to reduce the radiation dose to as low as reasonably achievable.  COMPARISON: Chest x-ray 01/09/2014, CT angiogram chest 08/28/2018 HISTORY: ORDERING SYSTEM PROVIDED HISTORY: hypoxia / covid / elevated dimer / r/o PE TECHNOLOGIST PROVIDED HISTORY: hypoxia / covid / elevated dimer / r/o PE Decision Support Exception - unselect if not a suspected or confirmed emergency medical condition->Emergency Medical Condition (MA) Reason for Exam: hypoxia / covid / elevated dimer / r/o PE Acuity: Acute Type of Exam: Initial FINDINGS: Pulmonary Arteries: Is no central to segmental pulmonary emboli. Main pulmonary artery is unremarkable caliber. Aorta is mildly ectatic measuring 4 cm. Mediastinum: Cardiomegaly. No pericardial effusion. Coronary disease. Few scattered calcified granulomas identified involving mediastinal hilar regions. There is layering debris identified within the mid to distal esophagus could represent reflux or esophagitis. Bronchial wall thickening could suggest bronchitis. Trachea is patent. A few prominent hilar lymph nodes likely reactive. These are subcentimeter short axis dimension. Lungs/pleura: There is scattered subpleural areas of interstitial thickening may represent areas of edema versus developing atypical infection these are most pronounced within the left medial upper lung. Otherwise scattered areas of likely areas of fibrosis or scarring mild atelectasis involving lung bases. Mild emphysema. No effusion. No pneumothorax. No suspicious pulmonary nodules. Bronchial wall thickening could suggest bronchitis. Upper Abdomen: Adrenal gland thickening likely reactive. Gallstones. Soft Tissues/Bones: Multilevel degenerate change. No central to segmental pulmonary artery emboli. Patchy interstitial thickening identified left upper lung medially and left lingula medially involve portions of the right upper lung noted may represent atypical pneumonia versus interstitial pulmonary edema versus scattered areas of fibrosis. Cholelithiasis.        LABS:  Results for orders placed or performed during the hospital encounter of 10/13/21   CBC Auto Differential   Result Value Ref Range    WBC 5.4 3.5 - 11.3 k/uL    RBC 5.62 4.21 - 5.77 m/uL    Hemoglobin 16.4 13.0 - 17.0 g/dL    Hematocrit 49.2 40.7 - 50.3 %    MCV 87.5 82.6 - 102.9 fL    MCH 29.2 25.2 - 33.5 pg    MCHC 33.3 25.2 - 33.5 g/dL    RDW 12.8 11.8 - 14.4 %    Platelets See Reflexed IPF Result 138 - 453 k/uL    MPV NOT REPORTED 8.1 - 13.5 fL    NRBC Automated 0.0 0.0 per 100 WBC    Differential Type NOT REPORTED     WBC Morphology NOT REPORTED     RBC Morphology NOT REPORTED     Platelet Estimate NOT REPORTED     Seg Neutrophils 66 (H) 36 - 65 %    Lymphocytes 20 (L) 24 - 43 %    Monocytes 13 (H) 3 - 12 %    Eosinophils % 0 (L) 1 - 4 %    Basophils 0 0 - 2 %    Immature Granulocytes 1 (H) 0 %    Segs Absolute 3.52 1.50 - 8.10 k/uL    Absolute Lymph # 1.08 (L) 1.10 - 3.70 k/uL    Absolute Mono # 0.72 0.10 - 1.20 k/uL    Absolute Eos # <0.03 0.00 - 0.44 k/uL    Basophils Absolute <0.03 0.00 - 0.20 k/uL    Absolute Immature Granulocyte 0.03 0.00 - 0.30 k/uL   Comprehensive Metabolic Panel   Result Value Ref Range    Glucose 157 (H) 70 - 99 mg/dL    BUN 11 8 - 23 mg/dL    CREATININE 0.85 0.70 - 1.20 mg/dL    Bun/Cre Ratio 13 9 - 20    Calcium 9.1 8.6 - 10.4 mg/dL    Sodium 131 (L) 135 - 144 mmol/L    Potassium 4.1 3.7 - 5.3 mmol/L    Chloride 93 (L) 98 - 107 mmol/L    CO2 28 20 - 31 mmol/L    Anion Gap 10 9 - 17 mmol/L    Alkaline Phosphatase 188 (H) 40 - 129 U/L    ALT 22 5 - 41 U/L    AST 26 <40 U/L    Total Bilirubin 0.38 0.3 - 1.2 mg/dL    Total Protein 7.5 6.4 - 8.3 g/dL    Albumin 4.1 3.5 - 5.2 g/dL    Albumin/Globulin Ratio 1.2 1.0 - 2.5    GFR Non-African American >60 >60 mL/min    GFR African American >60 >60 mL/min    GFR Comment          GFR Staging NOT REPORTED    Troponin   Result Value Ref Range    Troponin, High Sensitivity 14 0 - 22 ng/L    Troponin T NOT REPORTED <0.03 ng/mL    Troponin Interp NOT REPORTED    D-Dimer, Quantitative   Result Value Ref Range    D-Dimer, Quant 0.86 (H) 0.00 - 0.59 mg/L FEU   Lactate, Sepsis   Result Value Ref Range    Lactic Acid, Sepsis 0.9 0.5 - 1.9 mmol/L    Lactic Acid, Sepsis, Whole Blood NOT REPORTED 0.5 - 1.9 mmol/L   Immature Platelet Fraction   Result Value Ref Range    Platelet, Immature Fraction 6.2 1.1 - 10.3 %    Platelet, Fluorescence 110 (L) 138 - 453 k/uL   EKG 12 Lead   Result Value Ref Range    Ventricular Rate 71 BPM    Atrial Rate 71 BPM    P-R Interval 160 ms    QRS Duration 90 ms    Q-T Interval 402 ms    QTc Calculation (Bazett) 436 ms    P Axis 22 degrees    R Axis 1 degrees    T Axis 55 degrees       EMERGENCY DEPARTMENT COURSE:   Vitals:    Vitals:    10/13/21 2119 10/13/21 2245 10/14/21 0044   BP: 111/71 112/81    Pulse: 73 72    Resp: 20     Temp: 99.3 °F (37.4 °C)     TempSrc: Tympanic     SpO2: 93% 94% 95%   Weight: 163 lb (73.9 kg)     Height: 5' 8\" (1.727 m)       -------------------------  BP: 112/81, Temp: 99.3 °F (37.4 °C), Pulse: 72, Resp: 20    CONSULTS:  None    PROCEDURES:  None    FINAL IMPRESSION      1. COVID-19    2. Acute bronchitis due to other specified organisms          DISPOSITION/PLAN   DISPOSITION Decision To Discharge 10/14/2021 12:25:54 AM      PATIENT REFERRED TO:  Brisa Garcia MD  [address] [facility] [place]al[place]  [phone]    In 2 days        DISCHARGE MEDICATIONS:  Discharge Medication List as of 10/14/2021 12:54 AM      START taking these medications    Details   albuterol sulfate HFA (VENTOLIN HFA) 108 (90 Base) MCG/ACT inhaler Inhale 2 puffs into the lungs 4 times daily as needed for Wheezing, Disp-54 g, R-1Normal      predniSONE (DELTASONE) 10 MG tablet Take 4 tablets by mouth once daily for 5 days, Disp-16 tablet, R-0Normal      azithromycin (ZITHROMAX) 250 MG tablet Take 1 tablet by mouth daily for 4 days, Disp-4 tablet, R-0Normal             There are no active hospital problems to display for this patient.       (Please note that portions of this note were completed with [employer]ice recognition program.  Efforts were made to edit the dictations but occasionally words are mis-transcribed.)    Renetta Perez MD, [facility]  Attending Emergency Medicine Physician        Renetta Perez MD  10/14/21 7392       Renetta Perez MD  10/14/21 4547

## 2021-10-14 NOTE — PROGRESS NOTES
Home Oxygen Evaluation    Home Oxygen evaluation Results completed.     Room air SpO2    At rest 95%  With exercise/exertion 92%      Ryan Hernandez RCP   12:56 AM

## 2021-10-15 ENCOUNTER — CARE COORDINATION (OUTPATIENT)
Dept: CARE COORDINATION | Age: 66
End: 2021-10-15

## 2021-10-15 LAB
EKG ATRIAL RATE: 71 BPM
EKG P AXIS: 22 DEGREES
EKG P-R INTERVAL: 160 MS
EKG Q-T INTERVAL: 402 MS
EKG QRS DURATION: 90 MS
EKG QTC CALCULATION (BAZETT): 436 MS
EKG R AXIS: 1 DEGREES
EKG T AXIS: 55 DEGREES
EKG VENTRICULAR RATE: 71 BPM

## 2021-10-15 NOTE — CARE COORDINATION
Grisel Chaparro was seen [facility] 10/14/2021. He had a positive Covid test 10/9/2021. Covid 19, Acute bronchitis. He was given a pulse ox, incentive spirometer,  Zithromax, prednisone, and albuterol HFA. 10/15/2021- 12:19 pm spoke with Grisel Chaparro. He stated he did start all medications from ER and Sp02 - on room air 93-98%. He is using incentive spirometer. He stated he took a Covid test in his home 10/9/2021 and it was positive. Discussed staying in quarantine. He voiced understanding. Discussed need to establish with a PCP. He was given [phone] to schedule. He declined this writer scheduling. We reviewed Urgent Care hours for non-emergency care. Reviewed Covid 19 Zone Tool. Advised on symptom management, reporting worsening of symptoms, deep breathing exercises, staying active and hydrated. Patient voiced understanding. Patient contacted regarding COVID-19 diagnosis and pulse oximeter ordered at discharge. Discussed COVID-19 related testing which was available at this time. Test results were positive. Patient informed of results, if available? Yes. Ambulatory Care Manager contacted the patient by telephone to perform post discharge assessment. Call within 2 business days of discharge: Yes. Verified name and  with patient as identifiers. Provided introduction to self, and explanation of the CTN/ACM role, and reason for call due to risk factors for infection and/or exposure to COVID-19. Symptoms reviewed with patient who verbalized the following symptoms: cough, shortness of breath, no new symptoms and no worsening symptoms. Due to no new or worsening symptoms encounter was not routed to provider for escalation. Discussed follow-up appointments. If no appointment was previously scheduled, appointment scheduling offered: Yes. [facility] follow up appointment(s): No future appointments.   Non-[facility] follow up appointment(s): n/a    Non-face-to-face services provided:  Obtained and reviewed discharge summary and/or continuity of care documents     Advance Care Planning:   Does patient have an Advance Directive:  decision maker updated. Educated patient about risk for severe COVID-19 due to risk factors according to CDC guidelines. ACM reviewed discharge instructions, medical action plan and red flag symptoms with the patient who verbalized understanding. Discussed COVID vaccination status: Yes. Education provided on COVID-19 vaccination as appropriate. Discussed exposure protocols and quarantine with CDC Guidelines. Patient was given an opportunity to verbalize any questions and concerns and agrees to contact ACM or health care provider for questions related to their healthcare. Reviewed and educated patient on any new and changed medications related to discharge diagnosis     Was patient discharged with a pulse oximeter? Yes Discussed and confirmed pulse oximeter discharge instructions and when to notify provider or seek emergency care. ACM provided contact information. Plan for follow-up call in 3-5 days based on severity of symptoms and risk factors.

## 2021-10-18 ENCOUNTER — CARE COORDINATION (OUTPATIENT)
Dept: CARE COORDINATION | Age: 66
End: 2021-10-18

## 2021-10-18 NOTE — CARE COORDINATION
10/18/2021- spoke with Prabhu Cole. Still with achiness. He stated Sp02 running 93% on room air. He stated he is better than he was. He declined this writer scheduling PCP apt. He missed establishing with PCP 2019. He requested this writer f/u next week and he may then schedule with a PCP. We reviewed Urgent Care also. Reviewed again Covid 19 Zone Tool. Advised on symptom management, reporting worsening of symptoms, deep breathing exercises, staying active and hydrated. Patient voiced understanding. Patient contacted regarding COVID-19 risk, exposure, diagnosis, pulse oximeter ordered at discharge and monoclonal antibody infusion follow up. Discussed COVID-19 related testing which was available at this time. Test results were positive. Patient informed of results, if available? Yes    Ambulatory Care Manager contacted the patient by telephone to perform follow-up assessment. Verified name and  with patient as identifiers. Patient has following risk factors of: no known risk factors. Symptoms reviewed with patient who verbalized the following symptoms: pain or aching joints, cough, no new symptoms and no worsening symptoms. Due to no new or worsening symptoms encounter was not routed to provider for escalation. Educated patient about risk for severe COVID-19 due to risk factors according to CDC guidelines. ACM reviewed discharge instructions, medical action plan and red flag symptoms with the patient who verbalized understanding. Discussed COVID vaccination status: Yes. Education provided on COVID-19 vaccination as appropriate. Discussed exposure protocols and quarantine with CDC Guidelines. Patient was given an opportunity to verbalize any questions and concerns and agrees to contact ACM or health care provider for questions related to their healthcare. Was patient discharged with a pulse oximeter?  Yes Discussed and confirmed pulse oximeter discharge instructions and when to notify provider or seek emergency care. ACM provided contact information. Plan for follow-up call in 5-7 days based on severity of symptoms and risk factors.

## 2021-10-25 ENCOUNTER — CARE COORDINATION (OUTPATIENT)
Dept: CARE COORDINATION | Age: 66
End: 2021-10-25

## 2021-10-25 NOTE — CARE COORDINATION
10/25/2021- 10:49 am- Unable to reach by phone for subsequent ED/Covid F/U Call- voice mail not set up.    2:58 pm spoke with Melodie Shea. He stated he had fever this am. He stated he just seems to be short of breath. Not worse just unchanged. He does not have Sp02 monitor. He stated he has someone that can take to Urgent Care/ED after 5:30 pm. He stated he is taking Ibuprofen. He has finished Zithromax and prednisone. Advised that he needs follow up. He has not established with Dr. Susana Lucero. He has albuterol and is using it. He stated he is drinking fluids. He then decided he would have a friend take him to Urgent Care after his friend gets off work between 5-5:30 pm.     Patient contacted regarding COVID-19 diagnosis. Discussed COVID-19 related testing which was available at this time. Test results were positive. Patient informed of results, if available? Yes    Ambulatory Care Manager contacted the patient by telephone to perform follow-up assessment. Verified name and  with patient as identifiers. Patient has following risk factors of: no known risk factors. Symptoms reviewed with patient who verbalized the following symptoms: fever, shortness of breath, no new symptoms and no worsening symptoms. Due to no new or worsening symptoms encounter was not routed to provider for escalation. Educated patient about risk for severe COVID-19 due to risk factors according to CDC guidelines. ACM reviewed discharge instructions, medical action plan and red flag symptoms with the patient who verbalized understanding. Discussed COVID vaccination status: Yes. Education provided on COVID-19 vaccination as appropriate. Discussed exposure protocols and quarantine with CDC Guidelines. Patient was given an opportunity to verbalize any questions and concerns and agrees to contact ACM or health care provider for questions related to their healthcare. Was patient discharged with a pulse oximeter?  No Discussed and confirmed pulse oximeter discharge instructions and when to notify provider or seek emergency care. ACM provided contact information. Plan for follow-up call in 3-5 days based on severity of symptoms and risk factors.

## 2021-10-28 ENCOUNTER — CARE COORDINATION (OUTPATIENT)
Dept: CARE COORDINATION | Age: 66
End: 2021-10-28

## 2021-10-28 NOTE — CARE COORDINATION
10/28/2021- 9:42 am unable to reach by phone for ED F/U call- voice mail not set up. Will try later today. 11:20 am spoke with Makeda Oconnor. He stated he is doing better. We discussed establishing with a PCP. He would like an early afternoon apt. He stated he would prefer IM. Called and scheduled. Dr. Marysol Vasquez Dec 16 at 11 am. Spoke with Makeda Oconnor and he was in agreement. He wrote it down. We discussed being seeing sooner and he declined. He stated he is feeling better. We reviewed Urgent Care hours and voiced understanding. He has this writer's contact information and will call if needed. Future Appointments   Date Time Provider Steven Powers   12/16/2021 11:00 AM Malick Murcia MD [facility]         Your Patient resolved from the Care Transitions episode on 10/28/2021  Discussed COVID-19 related testing which was available at this time. Test results were positive. Patient informed of results, if available? Yes    Patient/family has been provided the following resources and education related to COVID-19:                         Signs, symptoms and red flags related to COVID-19            CDC exposure and quarantine guidelines            [employer] exposure contact - [phone]            Contact for their local Department of Health                 Patient currently reports that the following symptoms have improved:  cough and no new/worsening symptoms     No further outreach scheduled with this CTN/ACM. Episode of Care resolved. Patient has this CTN/ACM contact information if future needs arise.

## 2021-11-20 ENCOUNTER — HOSPITAL ENCOUNTER (EMERGENCY)
Age: 66
Discharge: HOME | End: 2021-11-20
Attending: EMERGENCY MEDICINE
Payer: MEDICARE

## 2021-11-20 VITALS
TEMPERATURE: 97.3 F | HEIGHT: 68 IN | OXYGEN SATURATION: 92 % | SYSTOLIC BLOOD PRESSURE: 107 MMHG | BODY MASS INDEX: 23.49 KG/M2 | HEART RATE: 88 BPM | DIASTOLIC BLOOD PRESSURE: 85 MMHG | WEIGHT: 155 LBS | RESPIRATION RATE: 16 BRPM
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
## 2021-11-20 PROCEDURE — 6370000000 HC RX 637 (ALT 250 FOR IP): Performed by: EMERGENCY MEDICINE

## 2021-11-20 PROCEDURE — 90471 IMMUNIZATION ADMIN: CPT | Performed by: EMERGENCY MEDICINE

## 2021-11-20 PROCEDURE — 99285 EMERGENCY DEPT VISIT HI MDM: CPT

## 2021-11-20 PROCEDURE — 12002 RPR S/N/AX/GEN/TRNK2.6-7.5CM: CPT

## 2021-11-20 PROCEDURE — 2500000003 HC RX 250 WO HCPCS: Performed by: EMERGENCY MEDICINE

## 2021-11-20 PROCEDURE — 6360000002 HC RX W HCPCS: Performed by: EMERGENCY MEDICINE

## 2021-11-20 PROCEDURE — 90715 TDAP VACCINE 7 YRS/> IM: CPT | Performed by: EMERGENCY MEDICINE

## 2021-11-20 RX ORDER — AMOXICILLIN AND CLAVULANATE POTASSIUM 875; 125 MG/1; MG/1
1 TABLET, FILM COATED ORAL 2 TIMES DAILY
Qty: 14 TABLET | Refills: 0 | Status: SHIPPED | OUTPATIENT
Start: 2021-11-20 | End: 2021-11-27

## 2021-11-20 RX ORDER — DIAPER,BRIEF,INFANT-TODD,DISP
EACH MISCELLANEOUS ONCE
Status: COMPLETED | OUTPATIENT
Start: 2021-11-20 | End: 2021-11-20

## 2021-11-20 RX ORDER — AMOXICILLIN AND CLAVULANATE POTASSIUM 875; 125 MG/1; MG/1
1 TABLET, FILM COATED ORAL ONCE
Status: COMPLETED | OUTPATIENT
Start: 2021-11-20 | End: 2021-11-20

## 2021-11-20 RX ORDER — LIDOCAINE HYDROCHLORIDE 10 MG/ML
20 INJECTION, SOLUTION INFILTRATION; PERINEURAL ONCE
Status: COMPLETED | OUTPATIENT
Start: 2021-11-20 | End: 2021-11-20

## 2021-11-20 RX ADMIN — AMOXICILLIN AND CLAVULANATE POTASSIUM 1 TABLET: 875; 125 TABLET, FILM COATED ORAL at 18:06

## 2021-11-20 RX ADMIN — TETANUS TOXOID, REDUCED DIPHTHERIA TOXOID AND ACELLULAR PERTUSSIS VACCINE, ADSORBED 0.5 ML: 5; 2.5; 8; 8; 2.5 SUSPENSION INTRAMUSCULAR at 18:03

## 2021-11-20 RX ADMIN — BACITRACIN ZINC 1 G: 500 OINTMENT TOPICAL at 18:09

## 2021-11-20 RX ADMIN — LIDOCAINE HYDROCHLORIDE 2 ML: 10 INJECTION, SOLUTION INFILTRATION; PERINEURAL at 17:33

## 2021-11-20 ASSESSMENT — PAIN DESCRIPTION - FREQUENCY: FREQUENCY: CONTINUOUS

## 2021-11-20 ASSESSMENT — PAIN DESCRIPTION - PROGRESSION: CLINICAL_PROGRESSION: NOT CHANGED

## 2021-11-20 ASSESSMENT — PAIN SCALES - GENERAL
PAINLEVEL_OUTOF10: 9
PAINLEVEL_OUTOF10: 9

## 2021-11-20 ASSESSMENT — PAIN DESCRIPTION - DESCRIPTORS: DESCRIPTORS: ACHING;THROBBING

## 2021-11-20 ASSESSMENT — PAIN DESCRIPTION - ONSET: ONSET: ON-GOING

## 2021-11-20 ASSESSMENT — PAIN DESCRIPTION - LOCATION: LOCATION: HAND

## 2021-11-20 ASSESSMENT — PAIN DESCRIPTION - PAIN TYPE: TYPE: ACUTE PAIN

## 2021-11-20 ASSESSMENT — PAIN DESCRIPTION - ORIENTATION: ORIENTATION: LEFT

## 2021-11-20 NOTE — ED TRIAGE NOTES
Around 1600 today patient was bite by his neighbor's dog in the left hand and small marks to the right hand. Patient is unaware at this time if the dog is vaccinated.

## 2021-11-20 NOTE — ED PROVIDER NOTES
[address] 71      Pt Name: Sirisha Gallagher  MRN: [id number]  [place] 1955  Date of evaluation: 11/20/2021      CHIEF COMPLAINT       Chief Complaint   Patient presents with    Animal Bite         HISTORY OF PRESENT ILLNESS      The patient presents with dog bite to his hands. The patient was bit by his neighbor's pitbull when he was trying to get the pit bull away from his own dog. The patient has multiple small bites on his left hand and a larger one on the dorsal aspect between the index and thumb. He also has a small bite on his right hand. The patient is not sure of his tetanus status. He says the dog can be located to find out if it is rabies is up-to-date. He has no other injury. He is not a diabetic. He has no active bleeding. REVIEW OF SYSTEMS       All systems reviewed and negative unless noted in HPI. The patient denies fever or constitutional symptoms. Denies any neck pain or stiffness. Denies chest pain or shortness of breath. Dog bites to hands. Denies any weakness, numbness or focal neurologic deficit. Denies any skin rash or edema. No recent psychiatric issues. No easy bruising or bleeding. Denies any polyuria, polydypsia or history of immunocompromise. PAST MEDICAL HISTORY    has a past medical history of Asthma, Chronic back pain, DDD (degenerative disc disease), Left knee pain, Lumbago, Lumbar disc displacement without myelopathy, and Thoracic or lumbosacral neuritis or radiculitis, unspecified. SURGICAL HISTORY      has a past surgical history that includes Total knee arthroplasty (Left, 2009) and Vasectomy.     CURRENT MEDICATIONS       Previous Medications    ALBUTEROL SULFATE HFA (VENTOLIN HFA) 108 (90 BASE) MCG/ACT INHALER    Inhale 2 puffs into the lungs 4 times daily as needed for Wheezing    ATORVASTATIN (LIPITOR) 40 MG TABLET    Take 1 tablet by mouth nightly    BUDESONIDE-FORMOTEROL (SYMBICORT) 80-4.5 MCG/ACT AERO    Inhale 2 puffs into the lungs 2 times daily. CITALOPRAM (CELEXA) 10 MG TABLET    Take 1 tablet by mouth daily    CLONAZEPAM (KLONOPIN) 0.5 MG TABLET    Take 1 tablet by mouth 2 times daily as needed    GABAPENTIN (NEURONTIN) 300 MG CAPSULE    Take 1 capsule by mouth 3 times daily. IBUPROFEN (ADVIL;MOTRIN) 200 MG TABLET    Take 1 tablet by mouth every 8 hours as needed for Pain    NICOTINE (NICODERM CQ) 21 MG/24HR    Place 1 patch onto the skin    TIOTROPIUM (SPIRIVA) 18 MCG INHALATION CAPSULE    Inhale 18 mcg into the lungs daily. ALLERGIES     is allergic to zipsor [diclofenac potassium]. FAMILY HISTORY     has no family status information on file. family history is not on file. SOCIAL HISTORY      reports that he has been smoking cigarettes. He has been smoking about 0.25 packs per day. He has never used smokeless tobacco. He reports that he does not drink alcohol and does not use drugs. PHYSICAL EXAM     INITIAL VITALS:  height is 5' 8\" (1.727 m) and weight is 155 lb (70.3 kg). His tympanic temperature is 97.3 °F (36.3 °C). His blood pressure is 100/76 and his pulse is 88. His respiration is 18 and oxygen saturation is 92%. The patient is alert and oriented, in no apparent distress. HEENT is atraumatic. Pupils are PERRL at 4 mm. Mucous membranes moist.    Neck is supple. Heart sounds regular rate and rhythm with no gallops, murmurs, or rubs. Lungs clear, no wheezes, rales or rhonchi. Musculoskeletal exam: Multiple 1 cm or smaller lacerations on the patient's dorsal left hand. Most concerning is one over the MCP of the index finger. It is not obviously in the joint however. This wound does not gape open. 3 cm Y-shaped wound noted on the dorsal left hand between in the webspace between the index and thumb. No deep structures are involved. Patient can flex and extend the fingers normally. Mild bruising noted over the MCP of the left index. On the right hand a punctate wound is noted on the tip of the long finger at the nailbed. The only wound requiring sutured closure is the large Y-shaped wound on the left hand. The remainder the musculoskeletal exam is unremarkable. Skin: no rash or edema. Neurological exam reveals cranial nerves 2 through 12 grossly intact. Patient has equal  and normal deep tendon reflexes. Psychiatric: no hallucinations or suicidal ideation. Lymphatics.:  No lymphadenopathy. DIFFERENTIAL DIAGNOSIS/ MDM:     Dog bite, joint involvement, tendon involvement    DIAGNOSTIC RESULTS       EMERGENCY DEPARTMENT COURSE:   Vitals:    Vitals:    11/20/21 1715 11/20/21 1724   BP:  100/76   Pulse: 88    Resp: 18    Temp: 97.3 °F (36.3 °C)    TempSrc: Tympanic    SpO2: 92%    Weight: 155 lb (70.3 kg)    Height: 5' 8\" (1.727 m)      -------------------------  BP: 100/76, Temp: 97.3 °F (36.3 °C), Pulse: 88, Resp: 18      Re-evaluation Notes    The patient received antibiotics in the emergency department. I explained that he needed close follow-up with the surgeon to make sure he did not get an infection in the joint. The patient expressed understanding. He was placed in a splint after his wound was dressed. The patient is discharged in good condition. CONSULTS:    1864  Discussed with Dr. Blade Miller. Will see pt in office on Tuesday. PROCEDURES:    Laceration repair: All the patient's wounds were cleansed in soap and water. The large, 3 cm Y shaped wound was anesthetized with local instillation of 1% lidocaine without epinephrine. 2 mL was used. The wound was cleansed with chlorhexidine and irrigated with saline. No deep structures were involved. The wound was closed with six 5-0 Ethilon simple interrupted sutures. A dressing and a splint were applied by the nurse. FINAL IMPRESSION      1.  Dog bite, initial encounter          DISPOSITION/PLAN   DISPOSITION        Condition on Disposition    good    PATIENT REFERRED TO:  Balaji Carpenter MD  [phone]    On 11/23/2021  For wound re-check      DISCHARGE MEDICATIONS:  New Prescriptions    AMOXICILLIN-CLAVULANATE (AUGMENTIN) 875-125 MG PER TABLET    Take 1 tablet by mouth 2 times daily for 7 days       (Please note that portions of this note were completed with a voice recognition program.  Efforts were made to edit the dictations but occasionally words are mis-transcribed.)    Ashwin Thakur MD,, MD   Attending Emergency Physician         Lizandro Hodges MD  11/20/21 1800

## 2022-08-25 ENCOUNTER — HOSPITAL ENCOUNTER (EMERGENCY)
Age: 67
Discharge: HOME | End: 2022-08-25
Attending: EMERGENCY MEDICINE
Payer: COMMERCIAL

## 2022-08-25 VITALS
RESPIRATION RATE: 14 BRPM | WEIGHT: 145 LBS | DIASTOLIC BLOOD PRESSURE: 75 MMHG | OXYGEN SATURATION: 96 % | SYSTOLIC BLOOD PRESSURE: 104 MMHG | TEMPERATURE: 98.7 F | BODY MASS INDEX: 22.05 KG/M2 | HEART RATE: 74 BPM

## 2022-08-25 DIAGNOSIS — K64.5 THROMBOSED HEMORRHOIDS: Primary | ICD-10-CM

## 2022-08-25 PROCEDURE — 99283 EMERGENCY DEPT VISIT LOW MDM: CPT

## 2022-08-25 PROCEDURE — 2500000003 HC RX 250 WO HCPCS: Performed by: EMERGENCY MEDICINE

## 2022-08-25 RX ORDER — LIDOCAINE HYDROCHLORIDE 10 MG/ML
5 INJECTION, SOLUTION INFILTRATION; PERINEURAL ONCE
Status: COMPLETED | OUTPATIENT
Start: 2022-08-25 | End: 2022-08-25

## 2022-08-25 RX ADMIN — LIDOCAINE HYDROCHLORIDE 5 ML: 10 INJECTION, SOLUTION INFILTRATION; PERINEURAL at 19:19

## 2022-08-25 ASSESSMENT — PAIN - FUNCTIONAL ASSESSMENT
PAIN_FUNCTIONAL_ASSESSMENT: 0-10
PAIN_FUNCTIONAL_ASSESSMENT: 0-10

## 2022-08-25 ASSESSMENT — ENCOUNTER SYMPTOMS
ANAL BLEEDING: 0
ABDOMINAL PAIN: 0
BLOOD IN STOOL: 0

## 2022-08-25 ASSESSMENT — PAIN DESCRIPTION - LOCATION
LOCATION: RECTUM
LOCATION: RECTUM

## 2022-08-25 ASSESSMENT — PAIN SCALES - GENERAL
PAINLEVEL_OUTOF10: 4
PAINLEVEL_OUTOF10: 7

## 2022-08-25 ASSESSMENT — PAIN DESCRIPTION - FREQUENCY: FREQUENCY: CONTINUOUS

## 2022-08-25 ASSESSMENT — PAIN DESCRIPTION - PAIN TYPE: TYPE: ACUTE PAIN

## 2022-08-25 NOTE — ED PROVIDER NOTES
[address] [facility] ED  [address]  Phone: [phone]        Pt Name: Myra Sanchez  MRN: [id number]  [place] 1955  Date of evaluation: 8/25/22      CHIEF COMPLAINT     Chief Complaint   Patient presents with    Hemorrhoids         HISTORY OF PRESENT ILLNESS  (Location/Symptom, Timing/Onset, Context/Setting, Quality, Duration, Modifying Factors, Severity.)    Myra Sanchez is a 74 y.o. male who presents hemorrhoid pain the patient states that he developed hemorrhoid pain starting approximately 2 days ago denies any direct trauma nothing he does makes his symptoms better or worse no abdominal pain no fever no chills      REVIEW OF SYSTEMS    (2-9 systems for level 4, 10 or more for level 5)     Review of Systems   Constitutional:  Negative for chills and fever. Gastrointestinal:  Negative for abdominal pain, anal bleeding and blood in stool. Hemorrhoid pain     PAST MEDICAL HISTORY    has a past medical history of Asthma, Chronic back pain, DDD (degenerative disc disease), Left knee pain, Lumbago, Lumbar disc displacement without myelopathy, and Thoracic or lumbosacral neuritis or radiculitis, unspecified. SURGICAL HISTORY      has a past surgical history that includes Total knee arthroplasty (Left, 2009) and Vasectomy. CURRENTMEDICATIONS       Previous Medications    ALBUTEROL SULFATE HFA (VENTOLIN HFA) 108 (90 BASE) MCG/ACT INHALER    Inhale 2 puffs into the lungs 4 times daily as needed for Wheezing    ATORVASTATIN (LIPITOR) 40 MG TABLET    Take 1 tablet by mouth nightly    BUDESONIDE-FORMOTEROL (SYMBICORT) 80-4.5 MCG/ACT AERO    Inhale 2 puffs into the lungs 2 times daily. CITALOPRAM (CELEXA) 10 MG TABLET    Take 1 tablet by mouth daily    CLONAZEPAM (KLONOPIN) 0.5 MG TABLET    Take 1 tablet by mouth 2 times daily as needed    GABAPENTIN (NEURONTIN) 300 MG CAPSULE    Take 1 capsule by mouth 3 times daily.     IBUPROFEN (ADVIL;MOTRIN) 200 MG TABLET Take 1 tablet by mouth every 8 hours as needed for Pain    NICOTINE (NICODERM CQ) 21 MG/24HR    Place 1 patch onto the skin    TIOTROPIUM (SPIRIVA) 18 MCG INHALATION CAPSULE    Inhale 18 mcg into the lungs daily. ALLERGIES     is allergic to zipsor [diclofenac potassium]. FAMILY HISTORY     has no family status information on file. family history is not on file. SOCIAL HISTORY      reports that he has been smoking cigarettes. He has been smoking an average of .5 packs per day. He has never used smokeless tobacco. He reports that he does not drink alcohol and does not use drugs. PHYSICAL EXAM    (up to 7 for level 4, 8 or more for level 5)   INITIAL VITALS:  weight is 145 lb (65.8 kg). His tympanic temperature is 98.7 °F (37.1 °C). His blood pressure is 154/81 (abnormal) and his pulse is 79. His respiration is 15 and oxygen saturation is 96%. Physical Exam  Vitals and nursing note reviewed. Exam conducted with a chaperone present. Constitutional:       Appearance: Normal appearance. HENT:      Head: Normocephalic and atraumatic. Eyes:      Conjunctiva/sclera: Conjunctivae normal.   Abdominal:      General: There is no distension. Palpations: Abdomen is soft. Tenderness: There is no abdominal tenderness. There is no guarding. Genitourinary:     Comments: Patient is noted to have a thrombosed hemorrhoid  Musculoskeletal:         General: Normal range of motion. Cervical back: Normal range of motion and neck supple. Skin:     General: Skin is warm and dry. Neurological:      General: No focal deficit present. Mental Status: He is alert. DIFFERENTIAL DIAGNOSIS/ MDM:     Patient presents with a thrombosed hemorrhoid we will go ahead and do an incision of the hemorrhoid to remove the clot    DIAGNOSTIC RESULTS         LABS:  No results found for this visit on 08/25/22.         EMERGENCY DEPARTMENT COURSE:   Vitals:    Vitals:    08/25/22 1901   BP: (!) 154/81 Pulse: 79   Resp: 15   Temp: 98.7 °F (37.1 °C)   TempSrc: Tympanic   SpO2: 96%   Weight: 145 lb (65.8 kg)     -------------------------  BP: (!) 154/81, Temp: 98.7 °F (37.1 °C), Heart Rate: 79, Resp: 15      RE-EVALUATION:  The patient had incision of his thrombosed hemorrhoid I will go ahead and place him on Tucks medicated pads I am recommending that he return to the ER for any further pain any abdominal pain fever or other concerns otherwise to follow-up with his family doctor within the next few days  At this time the patient is without objective evidence of an acute process requiring hospitalization or inpatient management. They have remained hemodynamically stable throughout their entire ED visit and are stable for discharge with outpatient follow-up. The patient understands that at this time there is no evidence for a more malignant underlying process, but the patient also understands that early in the process of an illness or injury, an emergency department workup can be falsely reassuring. Routine discharge counseling was given, and the patient understands that worsening, changing or persistent symptoms should prompt an immediate call or follow up with their primary physician or return to the emergency department. The importance of appropriate follow up was also discussed. I have reviewed the disposition diagnosis with the patient and or their family/guardian. I have answered their questions and given discharge instructions. They voiced understanding of these instructions and did not have any further questions or complaints. PROCEDURES:  The patient does have his hemorrhoid area prepped with Betadine anesthetized with approximately 4 cc of 1% lidocaine using a #15 blade scalpel an elliptical incision is made and a large clot is expressed from the hemorrhoid the patient tolerated this well without any complications    FINAL IMPRESSION      1.  Thrombosed hemorrhoids          DISPOSITION/PLAN DISPOSITION Decision To Discharge 08/25/2022 07:21:48 PM      CONDITION ON DISPOSITION:   Improved - Stable    PATIENT REFERRED TO:  Brisa Garcia MD  [address] [facility] [phone]    Call in 2 days      DISCHARGE MEDICATIONS:  New Prescriptions    WITCH HAZEL-GLYCERIN (TUCKS) PAD    Place rectally as needed. (Please note that portions of this note were completed with a voicerecognition program.  Efforts were made to edit the dictations but occasionally words are mis-transcribed.)    Bud Lazo MD,, MD, F.A.C.E.P.   Attending Emergency Medicine Physician       Bud Lazo MD  08/25/22 8938

## 2022-08-25 NOTE — ED NOTES
Pt ambulatory to Room 5 with c/o hemorrhoids x 4 days with pain rating 7/10. Pt states he hasn't noticed blood but has been very uncomfortable and hurts to sit. Pt in NAD, rr even and unlabored, speaking clearly and in full sentences. Pt provided gown.       Sofía Vizcarra RN  08/25/22 3974

## 2022-08-25 NOTE — DISCHARGE INSTRUCTIONS
Return to the ER for any further pain any fever bleeding or drainage signs of infection abdominal pain or other concerns otherwise you are to follow-up with your family doctor within the next few days  Please understand that at this time there is no evidence for a more serious underlying process, but that early in the process of an illness or injury, an emergency department workup can be falsely reassuring. You should contact your family doctor within the next 48 hours for a follow up appointment    Nataliejinolivia Patterson!!!    From [facility] ([facility]) and [facility]    On behalf of the Emergency Department staff at [facility]), I would like to thank you for giving us the opportunity to address your health care needs and concerns. We hope that during your visit, our service was delivered in a professional and caring manner. Please keep [facility] ([facility]) in mind as we walk with you down the path to your own personal wellness. Please expect an automated text message or email from us so we can ask a few questions about your health and progress. Based on your answers, a clinician may call you back to offer help and instructions. Please understand that early in the process of an illness or injury, an emergency department workup can be falsely reassuring. If you notice any worsening, changing or persistent symptoms please call your family doctor or return to the ER immediately. Tell us how we did during your visit at [email or web address]   and let us know about your experience

## 2022-08-25 NOTE — ED NOTES
Writer at bedside for clot removal of Hemorrhoid. Pt tolerated well, large clot removed.       Kenny Roach RN  08/25/22 1920

## 2022-09-04 ENCOUNTER — APPOINTMENT (OUTPATIENT)
Dept: CT IMAGING | Age: 67
DRG: 872 | End: 2022-09-04
Payer: MEDICARE

## 2022-09-04 ENCOUNTER — HOSPITAL ENCOUNTER (INPATIENT)
Age: 67
LOS: 3 days | Discharge: HOME | DRG: 872 | End: 2022-09-07
Attending: EMERGENCY MEDICINE | Admitting: FAMILY MEDICINE
Payer: MEDICARE

## 2022-09-04 DIAGNOSIS — L03.113 CELLULITIS OF RIGHT ELBOW: Primary | ICD-10-CM

## 2022-09-04 DIAGNOSIS — R73.9 HYPERGLYCEMIA: ICD-10-CM

## 2022-09-04 PROBLEM — J44.9 COPD (CHRONIC OBSTRUCTIVE PULMONARY DISEASE) (HCC): Status: ACTIVE | Noted: 2022-09-04

## 2022-09-04 PROBLEM — Z72.0 TOBACCO USE: Status: ACTIVE | Noted: 2022-09-04

## 2022-09-04 LAB
ABSOLUTE EOS #: 0.16 K/UL (ref 0–0.44)
ABSOLUTE IMMATURE GRANULOCYTE: 0.16 K/UL (ref 0–0.3)
ABSOLUTE LYMPH #: 1.57 K/UL (ref 1.1–3.7)
ABSOLUTE MONO #: 1.57 K/UL (ref 0.1–1.2)
ALBUMIN SERPL-MCNC: 3.9 G/DL (ref 3.5–5.2)
ALBUMIN/GLOBULIN RATIO: 1.1 (ref 1–2.5)
ALP BLD-CCNC: 187 U/L (ref 40–129)
ALT SERPL-CCNC: 29 U/L (ref 5–41)
ANION GAP SERPL CALCULATED.3IONS-SCNC: 12 MMOL/L (ref 9–17)
AST SERPL-CCNC: 38 U/L
BASOPHILS # BLD: 0 % (ref 0–2)
BASOPHILS ABSOLUTE: 0 K/UL (ref 0–0.2)
BILIRUB SERPL-MCNC: 0.6 MG/DL (ref 0.3–1.2)
BUN BLDV-MCNC: 11 MG/DL (ref 8–23)
BUN/CREAT BLD: 15 (ref 9–20)
C-REACTIVE PROTEIN: 105.5 MG/L (ref 0–5)
CALCIUM SERPL-MCNC: 9.2 MG/DL (ref 8.6–10.4)
CHLORIDE BLD-SCNC: 94 MMOL/L (ref 98–107)
CO2: 24 MMOL/L (ref 20–31)
CREAT SERPL-MCNC: 0.75 MG/DL (ref 0.7–1.2)
EOSINOPHILS RELATIVE PERCENT: 1 % (ref 1–4)
GFR AFRICAN AMERICAN: >60 ML/MIN
GFR NON-AFRICAN AMERICAN: >60 ML/MIN
GFR SERPL CREATININE-BSD FRML MDRD: ABNORMAL ML/MIN/{1.73_M2}
GLUCOSE BLD-MCNC: 254 MG/DL (ref 75–110)
GLUCOSE BLD-MCNC: 330 MG/DL (ref 75–110)
GLUCOSE BLD-MCNC: 426 MG/DL (ref 70–99)
HCT VFR BLD CALC: 47.3 % (ref 40.7–50.3)
HEMOGLOBIN: 16.1 G/DL (ref 13–17)
IMMATURE GRANULOCYTES: 1 %
LACTIC ACID, SEPSIS: 1.8 MMOL/L (ref 0.5–1.9)
LACTIC ACID, SEPSIS: 3.1 MMOL/L (ref 0.5–1.9)
LYMPHOCYTES # BLD: 10 % (ref 24–43)
MCH RBC QN AUTO: 29.3 PG (ref 25.2–33.5)
MCHC RBC AUTO-ENTMCNC: 34 G/DL (ref 25.2–33.5)
MCV RBC AUTO: 86 FL (ref 82.6–102.9)
MONOCYTES # BLD: 10 % (ref 3–12)
MORPHOLOGY: ABNORMAL
NRBC AUTOMATED: 0 PER 100 WBC
PDW BLD-RTO: 13.2 % (ref 11.8–14.4)
PLATELET # BLD: 231 K/UL (ref 138–453)
PMV BLD AUTO: 10.8 FL (ref 8.1–13.5)
POTASSIUM SERPL-SCNC: 4 MMOL/L (ref 3.7–5.3)
RBC # BLD: 5.5 M/UL (ref 4.21–5.77)
SARS-COV-2, RAPID: NOT DETECTED
SEG NEUTROPHILS: 78 % (ref 36–65)
SEGMENTED NEUTROPHILS ABSOLUTE COUNT: 12.24 K/UL (ref 1.5–8.1)
SODIUM BLD-SCNC: 130 MMOL/L (ref 135–144)
SPECIMEN DESCRIPTION: NORMAL
TOTAL PROTEIN: 7.4 G/DL (ref 6.4–8.3)
WBC # BLD: 15.7 K/UL (ref 3.5–11.3)

## 2022-09-04 PROCEDURE — 6360000002 HC RX W HCPCS: Performed by: NURSE PRACTITIONER

## 2022-09-04 PROCEDURE — 86140 C-REACTIVE PROTEIN: CPT

## 2022-09-04 PROCEDURE — 6360000002 HC RX W HCPCS: Performed by: EMERGENCY MEDICINE

## 2022-09-04 PROCEDURE — 82947 ASSAY GLUCOSE BLOOD QUANT: CPT

## 2022-09-04 PROCEDURE — 6360000004 HC RX CONTRAST MEDICATION: Performed by: EMERGENCY MEDICINE

## 2022-09-04 PROCEDURE — 96365 THER/PROPH/DIAG IV INF INIT: CPT

## 2022-09-04 PROCEDURE — 6370000000 HC RX 637 (ALT 250 FOR IP): Performed by: EMERGENCY MEDICINE

## 2022-09-04 PROCEDURE — 87635 SARS-COV-2 COVID-19 AMP PRB: CPT

## 2022-09-04 PROCEDURE — 72125 CT NECK SPINE W/O DYE: CPT

## 2022-09-04 PROCEDURE — 6370000000 HC RX 637 (ALT 250 FOR IP): Performed by: NURSE PRACTITIONER

## 2022-09-04 PROCEDURE — 99285 EMERGENCY DEPT VISIT HI MDM: CPT

## 2022-09-04 PROCEDURE — 99221 1ST HOSP IP/OBS SF/LOW 40: CPT | Performed by: NURSE PRACTITIONER

## 2022-09-04 PROCEDURE — 80053 COMPREHEN METABOLIC PANEL: CPT

## 2022-09-04 PROCEDURE — 2580000003 HC RX 258: Performed by: EMERGENCY MEDICINE

## 2022-09-04 PROCEDURE — 83605 ASSAY OF LACTIC ACID: CPT

## 2022-09-04 PROCEDURE — 96375 TX/PRO/DX INJ NEW DRUG ADDON: CPT

## 2022-09-04 PROCEDURE — 2060000000 HC ICU INTERMEDIATE R&B

## 2022-09-04 PROCEDURE — 36415 COLL VENOUS BLD VENIPUNCTURE: CPT

## 2022-09-04 PROCEDURE — 87040 BLOOD CULTURE FOR BACTERIA: CPT

## 2022-09-04 PROCEDURE — 84681 ASSAY OF C-PEPTIDE: CPT

## 2022-09-04 PROCEDURE — 73201 CT UPPER EXTREMITY W/DYE: CPT

## 2022-09-04 PROCEDURE — 83036 HEMOGLOBIN GLYCOSYLATED A1C: CPT

## 2022-09-04 PROCEDURE — 85025 COMPLETE CBC W/AUTO DIFF WBC: CPT

## 2022-09-04 RX ORDER — NICOTINE 21 MG/24HR
1 PATCH, TRANSDERMAL 24 HOURS TRANSDERMAL DAILY
Status: DISCONTINUED | OUTPATIENT
Start: 2022-09-05 | End: 2022-09-07 | Stop reason: HOSPADM

## 2022-09-04 RX ORDER — ENOXAPARIN SODIUM 100 MG/ML
40 INJECTION SUBCUTANEOUS DAILY
Status: DISCONTINUED | OUTPATIENT
Start: 2022-09-05 | End: 2022-09-07 | Stop reason: HOSPADM

## 2022-09-04 RX ORDER — ATORVASTATIN CALCIUM 40 MG/1
40 TABLET, FILM COATED ORAL NIGHTLY
Status: DISCONTINUED | OUTPATIENT
Start: 2022-09-04 | End: 2022-09-07 | Stop reason: HOSPADM

## 2022-09-04 RX ORDER — 0.9 % SODIUM CHLORIDE 0.9 %
1000 INTRAVENOUS SOLUTION INTRAVENOUS ONCE
Status: COMPLETED | OUTPATIENT
Start: 2022-09-04 | End: 2022-09-04

## 2022-09-04 RX ORDER — 0.9 % SODIUM CHLORIDE 0.9 %
1000 INTRAVENOUS SOLUTION INTRAVENOUS ONCE
Status: DISCONTINUED | OUTPATIENT
Start: 2022-09-04 | End: 2022-09-04

## 2022-09-04 RX ORDER — KETOROLAC TROMETHAMINE 30 MG/ML
15 INJECTION, SOLUTION INTRAMUSCULAR; INTRAVENOUS EVERY 6 HOURS PRN
Status: DISCONTINUED | OUTPATIENT
Start: 2022-09-04 | End: 2022-09-07 | Stop reason: HOSPADM

## 2022-09-04 RX ORDER — HYDROCODONE BITARTRATE AND ACETAMINOPHEN 5; 325 MG/1; MG/1
2 TABLET ORAL EVERY 4 HOURS PRN
Status: DISCONTINUED | OUTPATIENT
Start: 2022-09-04 | End: 2022-09-07 | Stop reason: HOSPADM

## 2022-09-04 RX ORDER — CITALOPRAM 20 MG/1
10 TABLET ORAL DAILY
Status: DISCONTINUED | OUTPATIENT
Start: 2022-09-05 | End: 2022-09-07 | Stop reason: HOSPADM

## 2022-09-04 RX ORDER — ALBUTEROL SULFATE 90 UG/1
2 AEROSOL, METERED RESPIRATORY (INHALATION) EVERY 4 HOURS PRN
Status: DISCONTINUED | OUTPATIENT
Start: 2022-09-04 | End: 2022-09-07 | Stop reason: HOSPADM

## 2022-09-04 RX ORDER — CLONAZEPAM 0.5 MG/1
0.5 TABLET ORAL 2 TIMES DAILY PRN
Status: DISCONTINUED | OUTPATIENT
Start: 2022-09-04 | End: 2022-09-07 | Stop reason: HOSPADM

## 2022-09-04 RX ORDER — SODIUM CHLORIDE FOR INHALATION 0.9 %
3 VIAL, NEBULIZER (ML) INHALATION
Status: DISCONTINUED | OUTPATIENT
Start: 2022-09-04 | End: 2022-09-07 | Stop reason: HOSPADM

## 2022-09-04 RX ORDER — HYDROCODONE BITARTRATE AND ACETAMINOPHEN 5; 325 MG/1; MG/1
1 TABLET ORAL EVERY 4 HOURS PRN
Status: DISCONTINUED | OUTPATIENT
Start: 2022-09-04 | End: 2022-09-07 | Stop reason: HOSPADM

## 2022-09-04 RX ORDER — INSULIN LISPRO 100 [IU]/ML
0-8 INJECTION, SOLUTION INTRAVENOUS; SUBCUTANEOUS
Status: DISCONTINUED | OUTPATIENT
Start: 2022-09-05 | End: 2022-09-07 | Stop reason: HOSPADM

## 2022-09-04 RX ORDER — DEXTROSE MONOHYDRATE 100 MG/ML
INJECTION, SOLUTION INTRAVENOUS CONTINUOUS PRN
Status: DISCONTINUED | OUTPATIENT
Start: 2022-09-04 | End: 2022-09-07 | Stop reason: HOSPADM

## 2022-09-04 RX ORDER — INSULIN LISPRO 100 [IU]/ML
0-4 INJECTION, SOLUTION INTRAVENOUS; SUBCUTANEOUS NIGHTLY
Status: DISCONTINUED | OUTPATIENT
Start: 2022-09-04 | End: 2022-09-07 | Stop reason: HOSPADM

## 2022-09-04 RX ORDER — KETOROLAC TROMETHAMINE 30 MG/ML
30 INJECTION, SOLUTION INTRAMUSCULAR; INTRAVENOUS ONCE
Status: COMPLETED | OUTPATIENT
Start: 2022-09-04 | End: 2022-09-04

## 2022-09-04 RX ORDER — INSULIN GLARGINE 100 [IU]/ML
10 INJECTION, SOLUTION SUBCUTANEOUS NIGHTLY
Status: DISCONTINUED | OUTPATIENT
Start: 2022-09-04 | End: 2022-09-05

## 2022-09-04 RX ADMIN — INSULIN HUMAN 8 UNITS: 100 INJECTION, SOLUTION PARENTERAL at 17:27

## 2022-09-04 RX ADMIN — SODIUM CHLORIDE 1000 ML: 9 INJECTION, SOLUTION INTRAVENOUS at 17:59

## 2022-09-04 RX ADMIN — KETOROLAC TROMETHAMINE 30 MG: 30 INJECTION, SOLUTION INTRAMUSCULAR at 14:47

## 2022-09-04 RX ADMIN — IOPAMIDOL 75 ML: 755 INJECTION, SOLUTION INTRAVENOUS at 15:53

## 2022-09-04 RX ADMIN — CEFTRIAXONE 1000 MG: 1 INJECTION, POWDER, FOR SOLUTION INTRAMUSCULAR; INTRAVENOUS at 16:55

## 2022-09-04 RX ADMIN — KETOROLAC TROMETHAMINE 15 MG: 30 INJECTION, SOLUTION INTRAMUSCULAR at 22:26

## 2022-09-04 RX ADMIN — SODIUM CHLORIDE 1000 ML: 9 INJECTION, SOLUTION INTRAVENOUS at 16:56

## 2022-09-04 RX ADMIN — VANCOMYCIN HYDROCHLORIDE 1000 MG: 1 INJECTION, POWDER, LYOPHILIZED, FOR SOLUTION INTRAVENOUS at 17:37

## 2022-09-04 RX ADMIN — INSULIN GLARGINE 10 UNITS: 100 INJECTION, SOLUTION SUBCUTANEOUS at 22:11

## 2022-09-04 ASSESSMENT — ENCOUNTER SYMPTOMS
NAUSEA: 0
BLOOD IN STOOL: 0
TROUBLE SWALLOWING: 0
DIARRHEA: 0
WHEEZING: 0
SORE THROAT: 0
VOMITING: 0
SHORTNESS OF BREATH: 0
ABDOMINAL PAIN: 0
BACK PAIN: 0
CONSTIPATION: 0

## 2022-09-04 ASSESSMENT — PAIN DESCRIPTION - ORIENTATION
ORIENTATION: RIGHT
ORIENTATION: RIGHT

## 2022-09-04 ASSESSMENT — PAIN DESCRIPTION - LOCATION
LOCATION: ELBOW
LOCATION: ARM;ELBOW

## 2022-09-04 ASSESSMENT — PAIN SCALES - GENERAL
PAINLEVEL_OUTOF10: 7
PAINLEVEL_OUTOF10: 6
PAINLEVEL_OUTOF10: 0
PAINLEVEL_OUTOF10: 0
PAINLEVEL_OUTOF10: 7

## 2022-09-04 NOTE — PLAN OF CARE
Problem: Discharge Planning  Goal: Discharge to home or other facility with appropriate resources  Outcome: Progressing  Flowsheets (Taken 9/4/2022 4584)  Discharge to home or other facility with appropriate resources: Identify barriers to discharge with patient and caregiver

## 2022-09-04 NOTE — PROGRESS NOTES
[address] [facility] Pharmacokinetic Monitoring Service - Vancomycin     Johan Lawson is a 74 y.o. male starting on vancomycin therapy for skin and soft tissue. Pharmacy consulted by Dr. Maddie Toledo for monitoring and adjustment. Target Concentration: Goal trough of 10-15 mg/L and AUC/SAIRA <500 mg*hr/L    Additional Antimicrobials:     Pertinent Laboratory Values: Wt Readings from Last 1 Encounters:   09/04/22 160 lb (72.6 kg)     Temp Readings from Last 1 Encounters:   09/04/22 99.5 °F (37.5 °C)     Estimated Creatinine Clearance: 95 mL/min (based on SCr of 0.75 mg/dL). Recent Labs     09/04/22  1435   CREATININE 0.75   WBC 15.7*     Procalcitonin:     Pertinent Cultures:  Culture Date Source Results        MRSA Nasal Swab: N/A. Non-respiratory infection.     Plan:  Dosing recommendations based on Bayesian software  Start vancomycin 1000mg q12  Renal labs as indicated   Pharmacy will continue to monitor patient and adjust therapy as indicated    Thank you for the consult,  ENZO Mcfarlane [facility]  9/4/2022 4:46 PM
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
## 2022-09-05 LAB
ABSOLUTE EOS #: 0.24 K/UL (ref 0–0.44)
ABSOLUTE IMMATURE GRANULOCYTE: 0 K/UL (ref 0–0.3)
ABSOLUTE LYMPH #: 2.42 K/UL (ref 1.1–3.7)
ABSOLUTE MONO #: 1.57 K/UL (ref 0.1–1.2)
ANION GAP SERPL CALCULATED.3IONS-SCNC: 7 MMOL/L (ref 9–17)
BASOPHILS # BLD: 0 % (ref 0–2)
BASOPHILS ABSOLUTE: 0 K/UL (ref 0–0.2)
BUN BLDV-MCNC: 14 MG/DL (ref 8–23)
BUN/CREAT BLD: 19 (ref 9–20)
C-PEPTIDE: 4.5 NG/ML (ref 1.1–4.4)
CALCIUM SERPL-MCNC: 8.6 MG/DL (ref 8.6–10.4)
CHLORIDE BLD-SCNC: 100 MMOL/L (ref 98–107)
CO2: 26 MMOL/L (ref 20–31)
CREAT SERPL-MCNC: 0.73 MG/DL (ref 0.7–1.2)
EOSINOPHILS RELATIVE PERCENT: 2 % (ref 1–4)
GFR AFRICAN AMERICAN: >60 ML/MIN
GFR NON-AFRICAN AMERICAN: >60 ML/MIN
GFR SERPL CREATININE-BSD FRML MDRD: ABNORMAL ML/MIN/{1.73_M2}
GLUCOSE BLD-MCNC: 236 MG/DL (ref 75–110)
GLUCOSE BLD-MCNC: 265 MG/DL (ref 70–99)
GLUCOSE BLD-MCNC: 311 MG/DL (ref 75–110)
GLUCOSE BLD-MCNC: 99 MG/DL (ref 75–110)
HCT VFR BLD CALC: 39.2 % (ref 40.7–50.3)
HEMOGLOBIN: 13.2 G/DL (ref 13–17)
IMMATURE GRANULOCYTES: 0 %
LYMPHOCYTES # BLD: 20 % (ref 24–43)
MCH RBC QN AUTO: 29.4 PG (ref 25.2–33.5)
MCHC RBC AUTO-ENTMCNC: 33.7 G/DL (ref 25.2–33.5)
MCV RBC AUTO: 87.3 FL (ref 82.6–102.9)
MONOCYTES # BLD: 13 % (ref 3–12)
MORPHOLOGY: ABNORMAL
MORPHOLOGY: ABNORMAL
NRBC AUTOMATED: 0 PER 100 WBC
PDW BLD-RTO: 13.2 % (ref 11.8–14.4)
PLATELET # BLD: 211 K/UL (ref 138–453)
PMV BLD AUTO: 10.5 FL (ref 8.1–13.5)
POTASSIUM SERPL-SCNC: 3.8 MMOL/L (ref 3.7–5.3)
RBC # BLD: 4.49 M/UL (ref 4.21–5.77)
SEG NEUTROPHILS: 65 % (ref 36–65)
SEGMENTED NEUTROPHILS ABSOLUTE COUNT: 7.87 K/UL (ref 1.5–8.1)
SODIUM BLD-SCNC: 133 MMOL/L (ref 135–144)
WBC # BLD: 12.1 K/UL (ref 3.5–11.3)

## 2022-09-05 PROCEDURE — 99231 SBSQ HOSP IP/OBS SF/LOW 25: CPT | Performed by: FAMILY MEDICINE

## 2022-09-05 PROCEDURE — 82947 ASSAY GLUCOSE BLOOD QUANT: CPT

## 2022-09-05 PROCEDURE — 2580000003 HC RX 258: Performed by: EMERGENCY MEDICINE

## 2022-09-05 PROCEDURE — 6370000000 HC RX 637 (ALT 250 FOR IP): Performed by: NURSE PRACTITIONER

## 2022-09-05 PROCEDURE — 85025 COMPLETE CBC W/AUTO DIFF WBC: CPT

## 2022-09-05 PROCEDURE — 6370000000 HC RX 637 (ALT 250 FOR IP): Performed by: FAMILY MEDICINE

## 2022-09-05 PROCEDURE — 94640 AIRWAY INHALATION TREATMENT: CPT

## 2022-09-05 PROCEDURE — 80048 BASIC METABOLIC PNL TOTAL CA: CPT

## 2022-09-05 PROCEDURE — 36415 COLL VENOUS BLD VENIPUNCTURE: CPT

## 2022-09-05 PROCEDURE — 2060000000 HC ICU INTERMEDIATE R&B

## 2022-09-05 PROCEDURE — 6360000002 HC RX W HCPCS: Performed by: NURSE PRACTITIONER

## 2022-09-05 PROCEDURE — 6360000002 HC RX W HCPCS: Performed by: EMERGENCY MEDICINE

## 2022-09-05 PROCEDURE — 94760 N-INVAS EAR/PLS OXIMETRY 1: CPT

## 2022-09-05 RX ORDER — INSULIN LISPRO 100 [IU]/ML
5 INJECTION, SOLUTION INTRAVENOUS; SUBCUTANEOUS
Status: DISCONTINUED | OUTPATIENT
Start: 2022-09-05 | End: 2022-09-07 | Stop reason: HOSPADM

## 2022-09-05 RX ORDER — SODIUM CHLORIDE 1000 MG
1 TABLET, SOLUBLE MISCELLANEOUS
Status: DISCONTINUED | OUTPATIENT
Start: 2022-09-05 | End: 2022-09-07 | Stop reason: HOSPADM

## 2022-09-05 RX ORDER — INSULIN GLARGINE 100 [IU]/ML
7 INJECTION, SOLUTION SUBCUTANEOUS 2 TIMES DAILY
Status: DISCONTINUED | OUTPATIENT
Start: 2022-09-05 | End: 2022-09-07 | Stop reason: HOSPADM

## 2022-09-05 RX ADMIN — INSULIN LISPRO 2 UNITS: 100 INJECTION, SOLUTION INTRAVENOUS; SUBCUTANEOUS at 12:37

## 2022-09-05 RX ADMIN — INSULIN LISPRO 2 UNITS: 100 INJECTION, SOLUTION INTRAVENOUS; SUBCUTANEOUS at 07:59

## 2022-09-05 RX ADMIN — HYDROCODONE BITARTRATE AND ACETAMINOPHEN 1 TABLET: 5; 325 TABLET ORAL at 20:21

## 2022-09-05 RX ADMIN — SODIUM CHLORIDE 1 G: 1 TABLET ORAL at 12:37

## 2022-09-05 RX ADMIN — TIOTROPIUM BROMIDE INHALATION SPRAY 2 PUFF: 3.12 SPRAY, METERED RESPIRATORY (INHALATION) at 08:59

## 2022-09-05 RX ADMIN — HYDROCODONE BITARTRATE AND ACETAMINOPHEN 1 TABLET: 5; 325 TABLET ORAL at 03:45

## 2022-09-05 RX ADMIN — HYDROCODONE BITARTRATE AND ACETAMINOPHEN 1 TABLET: 5; 325 TABLET ORAL at 15:29

## 2022-09-05 RX ADMIN — MOMETASONE FUROATE AND FORMOTEROL FUMARATE DIHYDRATE 2 PUFF: 100; 5 AEROSOL RESPIRATORY (INHALATION) at 19:38

## 2022-09-05 RX ADMIN — KETOROLAC TROMETHAMINE 15 MG: 30 INJECTION, SOLUTION INTRAMUSCULAR at 23:11

## 2022-09-05 RX ADMIN — INSULIN LISPRO 6 UNITS: 100 INJECTION, SOLUTION INTRAVENOUS; SUBCUTANEOUS at 17:40

## 2022-09-05 RX ADMIN — SODIUM CHLORIDE 1 G: 1 TABLET ORAL at 07:58

## 2022-09-05 RX ADMIN — INSULIN GLARGINE 7 UNITS: 100 INJECTION, SOLUTION SUBCUTANEOUS at 20:07

## 2022-09-05 RX ADMIN — MOMETASONE FUROATE AND FORMOTEROL FUMARATE DIHYDRATE 2 PUFF: 100; 5 AEROSOL RESPIRATORY (INHALATION) at 08:58

## 2022-09-05 RX ADMIN — VANCOMYCIN HYDROCHLORIDE 1000 MG: 1 INJECTION, POWDER, LYOPHILIZED, FOR SOLUTION INTRAVENOUS at 17:43

## 2022-09-05 RX ADMIN — VANCOMYCIN HYDROCHLORIDE 1000 MG: 1 INJECTION, POWDER, LYOPHILIZED, FOR SOLUTION INTRAVENOUS at 04:43

## 2022-09-05 RX ADMIN — INSULIN LISPRO 5 UNITS: 100 INJECTION, SOLUTION INTRAVENOUS; SUBCUTANEOUS at 12:37

## 2022-09-05 RX ADMIN — SODIUM CHLORIDE 1 G: 1 TABLET ORAL at 17:39

## 2022-09-05 RX ADMIN — INSULIN LISPRO 5 UNITS: 100 INJECTION, SOLUTION INTRAVENOUS; SUBCUTANEOUS at 17:39

## 2022-09-05 RX ADMIN — ATORVASTATIN CALCIUM 40 MG: 40 TABLET, FILM COATED ORAL at 20:05

## 2022-09-05 RX ADMIN — ENOXAPARIN SODIUM 40 MG: 100 INJECTION SUBCUTANEOUS at 07:58

## 2022-09-05 ASSESSMENT — PAIN SCALES - GENERAL
PAINLEVEL_OUTOF10: 5
PAINLEVEL_OUTOF10: 4
PAINLEVEL_OUTOF10: 6
PAINLEVEL_OUTOF10: 5

## 2022-09-05 ASSESSMENT — PAIN DESCRIPTION - LOCATION
LOCATION: BACK
LOCATION: ELBOW

## 2022-09-05 ASSESSMENT — PAIN DESCRIPTION - DESCRIPTORS
DESCRIPTORS: ACHING
DESCRIPTORS: ACHING

## 2022-09-05 ASSESSMENT — PAIN DESCRIPTION - ORIENTATION
ORIENTATION: RIGHT
ORIENTATION: RIGHT

## 2022-09-05 NOTE — PROGRESS NOTES
[address] [facility] Pharmacokinetic Monitoring Service - Vancomycin    Consulting Provider: Daren Zayas   Indication: SSTI  Target Concentration: Goal AUC/SAIRA 400-600 mg*hr/L  Day of Therapy: 2  Additional Antimicrobials: Ceftriaxone    Pertinent Laboratory Values: Wt Readings from Last 1 Encounters:   09/04/22 160 lb (72.6 kg)     Temp Readings from Last 1 Encounters:   09/05/22 98.4 °F (36.9 °C) (Tympanic)     Estimated Creatinine Clearance: 98 mL/min (based on SCr of 0.73 mg/dL). Recent Labs     09/04/22  1435 09/05/22  0601   CREATININE 0.75 0.73   WBC 15.7* 12.1*     Procalcitonin:      Pertinent Cultures:  Culture Date Source Results   09/04/22 blood NGTD   MRSA Nasal Swab: N/A. Non-respiratory infection.     Recent vancomycin administrations                     vancomycin 1000 mg IVPB in 250 mL D5W addavial (mg) 1,000 mg New Bag 09/05/22 0443     1,000 mg New Bag 09/04/22 1737                    Assessment:  Date/Time Current Dose Concentration Timing of Concentration (h) AUC   09/05/22 1000 mg q12h     495   Note: Serum concentrations collected for AUC dosing may appear elevated if collected in close proximity to the dose administered, this is not necessarily an indication of toxicity    Plan:  Current dosing regimen is therapeutic  Continue current dose  Repeat vancomycin concentration ordered for 09/06/22 @ [address] [place] will continue to monitor patient and adjust therapy as indicated    Thank you for the consult,  René Cannon [facility]  9/5/2022 9:38 AM
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
## 2022-09-06 LAB
ABSOLUTE EOS #: 0.17 K/UL (ref 0–0.44)
ABSOLUTE IMMATURE GRANULOCYTE: 0.04 K/UL (ref 0–0.3)
ABSOLUTE LYMPH #: 2.14 K/UL (ref 1.1–3.7)
ABSOLUTE MONO #: 1.3 K/UL (ref 0.1–1.2)
ANION GAP SERPL CALCULATED.3IONS-SCNC: 8 MMOL/L (ref 9–17)
BASOPHILS # BLD: 0 % (ref 0–2)
BASOPHILS ABSOLUTE: 0.03 K/UL (ref 0–0.2)
BUN BLDV-MCNC: 12 MG/DL (ref 8–23)
BUN/CREAT BLD: 17 (ref 9–20)
CALCIUM SERPL-MCNC: 8.8 MG/DL (ref 8.6–10.4)
CHLORIDE BLD-SCNC: 101 MMOL/L (ref 98–107)
CO2: 27 MMOL/L (ref 20–31)
CREAT SERPL-MCNC: 0.71 MG/DL (ref 0.7–1.2)
EOSINOPHILS RELATIVE PERCENT: 1 % (ref 1–4)
ESTIMATED AVERAGE GLUCOSE: 301 MG/DL
GFR AFRICAN AMERICAN: >60 ML/MIN
GFR NON-AFRICAN AMERICAN: >60 ML/MIN
GFR SERPL CREATININE-BSD FRML MDRD: ABNORMAL ML/MIN/{1.73_M2}
GLUCOSE BLD-MCNC: 108 MG/DL (ref 75–110)
GLUCOSE BLD-MCNC: 119 MG/DL (ref 75–110)
GLUCOSE BLD-MCNC: 227 MG/DL (ref 70–99)
GLUCOSE BLD-MCNC: 259 MG/DL (ref 75–110)
HBA1C MFR BLD: 12.1 % (ref 4–6)
HCT VFR BLD CALC: 39.2 % (ref 40.7–50.3)
HEMOGLOBIN: 13.6 G/DL (ref 13–17)
IMMATURE GRANULOCYTES: 0 %
LYMPHOCYTES # BLD: 18 % (ref 24–43)
MCH RBC QN AUTO: 29.6 PG (ref 25.2–33.5)
MCHC RBC AUTO-ENTMCNC: 34.7 G/DL (ref 25.2–33.5)
MCV RBC AUTO: 85.4 FL (ref 82.6–102.9)
MONOCYTES # BLD: 11 % (ref 3–12)
NRBC AUTOMATED: 0 PER 100 WBC
PDW BLD-RTO: 13.2 % (ref 11.8–14.4)
PLATELET # BLD: 222 K/UL (ref 138–453)
PMV BLD AUTO: 10.2 FL (ref 8.1–13.5)
POTASSIUM SERPL-SCNC: 3.8 MMOL/L (ref 3.7–5.3)
RBC # BLD: 4.59 M/UL (ref 4.21–5.77)
SEG NEUTROPHILS: 70 % (ref 36–65)
SEGMENTED NEUTROPHILS ABSOLUTE COUNT: 8.12 K/UL (ref 1.5–8.1)
SODIUM BLD-SCNC: 136 MMOL/L (ref 135–144)
VANCOMYCIN TROUGH: 9.9 UG/ML (ref 10–20)
WBC # BLD: 11.8 K/UL (ref 3.5–11.3)

## 2022-09-06 PROCEDURE — 99232 SBSQ HOSP IP/OBS MODERATE 35: CPT | Performed by: INTERNAL MEDICINE

## 2022-09-06 PROCEDURE — 6370000000 HC RX 637 (ALT 250 FOR IP): Performed by: NURSE PRACTITIONER

## 2022-09-06 PROCEDURE — 6360000002 HC RX W HCPCS: Performed by: EMERGENCY MEDICINE

## 2022-09-06 PROCEDURE — 82947 ASSAY GLUCOSE BLOOD QUANT: CPT

## 2022-09-06 PROCEDURE — 6370000000 HC RX 637 (ALT 250 FOR IP): Performed by: FAMILY MEDICINE

## 2022-09-06 PROCEDURE — 80048 BASIC METABOLIC PNL TOTAL CA: CPT

## 2022-09-06 PROCEDURE — 99231 SBSQ HOSP IP/OBS SF/LOW 25: CPT | Performed by: PHYSICIAN ASSISTANT

## 2022-09-06 PROCEDURE — 80202 ASSAY OF VANCOMYCIN: CPT

## 2022-09-06 PROCEDURE — 94640 AIRWAY INHALATION TREATMENT: CPT

## 2022-09-06 PROCEDURE — 85025 COMPLETE CBC W/AUTO DIFF WBC: CPT

## 2022-09-06 PROCEDURE — 6360000002 HC RX W HCPCS: Performed by: NURSE PRACTITIONER

## 2022-09-06 PROCEDURE — 2060000000 HC ICU INTERMEDIATE R&B

## 2022-09-06 PROCEDURE — 2580000003 HC RX 258: Performed by: EMERGENCY MEDICINE

## 2022-09-06 PROCEDURE — 6370000000 HC RX 637 (ALT 250 FOR IP): Performed by: INTERNAL MEDICINE

## 2022-09-06 PROCEDURE — 94761 N-INVAS EAR/PLS OXIMETRY MLT: CPT

## 2022-09-06 PROCEDURE — 36415 COLL VENOUS BLD VENIPUNCTURE: CPT

## 2022-09-06 RX ORDER — IPRATROPIUM BROMIDE AND ALBUTEROL SULFATE 2.5; .5 MG/3ML; MG/3ML
1 SOLUTION RESPIRATORY (INHALATION) 4 TIMES DAILY
Status: DISCONTINUED | OUTPATIENT
Start: 2022-09-06 | End: 2022-09-07 | Stop reason: HOSPADM

## 2022-09-06 RX ADMIN — HYDROCODONE BITARTRATE AND ACETAMINOPHEN 1 TABLET: 5; 325 TABLET ORAL at 19:33

## 2022-09-06 RX ADMIN — CITALOPRAM HYDROBROMIDE 10 MG: 20 TABLET ORAL at 07:53

## 2022-09-06 RX ADMIN — SODIUM CHLORIDE 1 G: 1 TABLET ORAL at 12:03

## 2022-09-06 RX ADMIN — CLONAZEPAM 0.5 MG: 0.5 TABLET ORAL at 21:15

## 2022-09-06 RX ADMIN — ATORVASTATIN CALCIUM 40 MG: 40 TABLET, FILM COATED ORAL at 21:15

## 2022-09-06 RX ADMIN — IPRATROPIUM BROMIDE AND ALBUTEROL SULFATE 1 AMPULE: 2.5; .5 SOLUTION RESPIRATORY (INHALATION) at 17:50

## 2022-09-06 RX ADMIN — MOMETASONE FUROATE AND FORMOTEROL FUMARATE DIHYDRATE 2 PUFF: 100; 5 AEROSOL RESPIRATORY (INHALATION) at 20:22

## 2022-09-06 RX ADMIN — TIOTROPIUM BROMIDE INHALATION SPRAY 2 PUFF: 3.12 SPRAY, METERED RESPIRATORY (INHALATION) at 08:18

## 2022-09-06 RX ADMIN — SODIUM CHLORIDE 1 G: 1 TABLET ORAL at 07:54

## 2022-09-06 RX ADMIN — INSULIN LISPRO 2 UNITS: 100 INJECTION, SOLUTION INTRAVENOUS; SUBCUTANEOUS at 07:54

## 2022-09-06 RX ADMIN — INSULIN GLARGINE 7 UNITS: 100 INJECTION, SOLUTION SUBCUTANEOUS at 07:54

## 2022-09-06 RX ADMIN — IPRATROPIUM BROMIDE AND ALBUTEROL SULFATE 1 AMPULE: 2.5; .5 SOLUTION RESPIRATORY (INHALATION) at 20:22

## 2022-09-06 RX ADMIN — VANCOMYCIN HYDROCHLORIDE 1000 MG: 1 INJECTION, POWDER, LYOPHILIZED, FOR SOLUTION INTRAVENOUS at 04:24

## 2022-09-06 RX ADMIN — HYDROCODONE BITARTRATE AND ACETAMINOPHEN 2 TABLET: 5; 325 TABLET ORAL at 01:06

## 2022-09-06 RX ADMIN — KETOROLAC TROMETHAMINE 15 MG: 30 INJECTION, SOLUTION INTRAMUSCULAR at 15:42

## 2022-09-06 RX ADMIN — MOMETASONE FUROATE AND FORMOTEROL FUMARATE DIHYDRATE 2 PUFF: 100; 5 AEROSOL RESPIRATORY (INHALATION) at 08:18

## 2022-09-06 RX ADMIN — IPRATROPIUM BROMIDE AND ALBUTEROL SULFATE 1 AMPULE: 2.5; .5 SOLUTION RESPIRATORY (INHALATION) at 11:48

## 2022-09-06 RX ADMIN — VANCOMYCIN HYDROCHLORIDE 1000 MG: 1 INJECTION, POWDER, LYOPHILIZED, FOR SOLUTION INTRAVENOUS at 16:59

## 2022-09-06 RX ADMIN — INSULIN GLARGINE 7 UNITS: 100 INJECTION, SOLUTION SUBCUTANEOUS at 21:15

## 2022-09-06 RX ADMIN — ENOXAPARIN SODIUM 40 MG: 100 INJECTION SUBCUTANEOUS at 07:54

## 2022-09-06 RX ADMIN — HYDROCODONE BITARTRATE AND ACETAMINOPHEN 1 TABLET: 5; 325 TABLET ORAL at 10:39

## 2022-09-06 RX ADMIN — INSULIN LISPRO 5 UNITS: 100 INJECTION, SOLUTION INTRAVENOUS; SUBCUTANEOUS at 07:55

## 2022-09-06 RX ADMIN — SODIUM CHLORIDE 1 G: 1 TABLET ORAL at 16:57

## 2022-09-06 ASSESSMENT — PAIN DESCRIPTION - ORIENTATION
ORIENTATION: MID
ORIENTATION: RIGHT
ORIENTATION: RIGHT
ORIENTATION: MID
ORIENTATION: RIGHT

## 2022-09-06 ASSESSMENT — PAIN DESCRIPTION - LOCATION
LOCATION: ELBOW
LOCATION: ELBOW
LOCATION: NECK;ARM
LOCATION: NECK
LOCATION: ELBOW
LOCATION: NECK

## 2022-09-06 ASSESSMENT — PAIN DESCRIPTION - PAIN TYPE: TYPE: CHRONIC PAIN

## 2022-09-06 ASSESSMENT — PAIN SCALES - GENERAL
PAINLEVEL_OUTOF10: 3
PAINLEVEL_OUTOF10: 7
PAINLEVEL_OUTOF10: 7
PAINLEVEL_OUTOF10: 5
PAINLEVEL_OUTOF10: 3
PAINLEVEL_OUTOF10: 6
PAINLEVEL_OUTOF10: 3

## 2022-09-06 ASSESSMENT — PAIN - FUNCTIONAL ASSESSMENT
PAIN_FUNCTIONAL_ASSESSMENT: ACTIVITIES ARE NOT PREVENTED

## 2022-09-06 ASSESSMENT — PAIN DESCRIPTION - DESCRIPTORS
DESCRIPTORS: SORE
DESCRIPTORS: ACHING
DESCRIPTORS: SORE
DESCRIPTORS: ACHING
DESCRIPTORS: ACHING

## 2022-09-06 ASSESSMENT — PAIN DESCRIPTION - ONSET
ONSET: ON-GOING
ONSET: ON-GOING

## 2022-09-06 ASSESSMENT — PAIN DESCRIPTION - FREQUENCY
FREQUENCY: INTERMITTENT
FREQUENCY: CONTINUOUS

## 2022-09-06 NOTE — CARE COORDINATION
Case Management Initial Discharge Plan  Gary Nunes             Met with:patient to discuss discharge plans. Information verified: address, contacts, phone number, , and insurance: Yes  Insurance Provider: Primary:  Payor: Jeramy Richey / Plan: Renee Smith / Product Type: *No Product type* /                                         Emergency Contact/Next of Kin name & number: Rocky Flores, see chart  Who are involved in patient's support system? Children    PCP: Ge BUSTAMANTE MD  Date of last visit: see chart    Discharge Planning    Living Arrangements:  [address] 1 [address]  3 stairs to climb to get into front door, stairs to climb to reach second floor  Location of bedroom/bathroom in home first    Patient able to perform ADL's:Independent    Current Services (outpatient & in home) none  DME equipment: n/a  DME provider: n/a    Is patient receiving oral anticoagulation therapy? No    If indicated:   Physician managing anticoagulation treatment: n/a  Where does patient obtain lab work for ATC treatment? N/a      Potential Assistance Needed:  N/A    Patient agreeable to home care: No  Percival of choice provided:  n/a    Prior SNF/Rehab Placement and Facility: none  Agreeable to SNF/Rehab: No  Percival of choice provided: n/a      Expected Discharge date:       Patient expects to be discharged to: If home: is the family and/or caregiver wiling & able to provide support at home? yes  Who will be providing this support? Daughter    Follow Up Appointment: Best Day/ Time: Monday AM    Transportation provider: self  Transportation arrangements needed for discharge: No    Readmission Risk              Risk of Unplanned Readmission:  7             Does patient have a readmission risk score greater than 20?: No  If yes, follow-up appointment must be made within 7 days of discharge.      Goals of Care:       Educated patient on transitional options, provided freedom of choice and are agreeable with plan      Discharge Plan: Patient lives at home with his daughter and mother and is independent. Pt denies any discharge needs at present time.           Electronically signed by Rosangela Posey RN on 9/6/22 at 10:44 AM EDT
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
## 2022-09-06 NOTE — PROGRESS NOTES
[address] [facility] Pharmacokinetic Monitoring Service - Vancomycin    Consulting Provider: Fredrick Samuel   Indication: SSTI  Target Concentration: Goal AUC/SAIRA 400-600 mg*hr/L  Day of Therapy: 3  Additional Antimicrobials: ceftriaxone    Pertinent Laboratory Values: Wt Readings from Last 1 Encounters:   09/06/22 160 lb 9.6 oz (72.8 kg)     Temp Readings from Last 1 Encounters:   09/06/22 98.2 °F (36.8 °C) (Oral)     Estimated Creatinine Clearance: 100 mL/min (based on SCr of 0.71 mg/dL). Recent Labs     09/05/22  0601 09/06/22  0329   CREATININE 0.73 0.71   WBC 12.1* 11.8*     Procalcitonin:      Pertinent Cultures:  Culture Date Source Results   09/04/22 blood NGTD   MRSA Nasal Swab: N/A. Non-respiratory infection.     Recent vancomycin administrations                     vancomycin 1000 mg IVPB in 250 mL D5W addavial (mg) 1,000 mg New Bag 09/06/22 0424     1,000 mg New Bag 09/05/22 1743     1,000 mg New Bag  0443     1,000 mg New Bag 09/04/22 1737                    Assessment:  Date/Time Current Dose Concentration Timing of Concentration (h) AUC   09/06/22 1000 mg q12h 9.9 11.5 428   Note: Serum concentrations collected for AUC dosing may appear elevated if collected in close proximity to the dose administered, this is not necessarily an indication of toxicity    Plan:  Current dosing regimen is therapeutic  Continue current dose  Repeat vancomycin concentration ordered for   @     Pharmacy will continue to monitor patient and adjust therapy as indicated    Thank you for the consult,  ENZO [facility]  9/6/2022 7:14 AM

## 2022-09-06 NOTE — PROGRESS NOTES
rounding in PCU. Assessment: Patient was sleeping. 09/06/22 1609   Encounter Summary   Encounter Overview/Reason  Attempted Encounter   Service Provided For: Patient   Referral/Consult From: [address] [place] [address] Street Parent; Children;Family members   Last Encounter  09/06/22   Complexity of Encounter Low   Begin Time 1614   End Time  1515   Total Time Calculated 1381 min   Assessment/Intervention/Outcome   Assessment Other (Comment)  (patient was sleeping)   Intervention Other (Comment)  (patient was sleeping)   Outcome Other (comment)  (patient was sleeping)   Plan and Referrals   Plan/Referrals Continue to visit, (comment)       Intervention: Prayed for Patient. Outcome: Patient expressed appreciation for visit and offer of continued prayer. Plan: Chaplains are available on site or on call 24/7 for spiritual and emotional support.     Electronically signed by Ruddy Nickerson on 9/6/2022 at 4:11 PM

## 2022-09-07 ENCOUNTER — TELEPHONE (OUTPATIENT)
Dept: INTERNAL MEDICINE | Age: 67
End: 2022-09-07

## 2022-09-07 VITALS
SYSTOLIC BLOOD PRESSURE: 128 MMHG | TEMPERATURE: 97.9 F | BODY MASS INDEX: 23.82 KG/M2 | OXYGEN SATURATION: 95 % | HEIGHT: 68 IN | DIASTOLIC BLOOD PRESSURE: 79 MMHG | WEIGHT: 157.2 LBS | RESPIRATION RATE: 16 BRPM | HEART RATE: 67 BPM

## 2022-09-07 LAB
ABSOLUTE EOS #: 0.2 K/UL (ref 0–0.44)
ABSOLUTE IMMATURE GRANULOCYTE: 0.03 K/UL (ref 0–0.3)
ABSOLUTE LYMPH #: 2.1 K/UL (ref 1.1–3.7)
ABSOLUTE MONO #: 1.13 K/UL (ref 0.1–1.2)
ANION GAP SERPL CALCULATED.3IONS-SCNC: 10 MMOL/L (ref 9–17)
BASOPHILS # BLD: 0 % (ref 0–2)
BASOPHILS ABSOLUTE: 0.03 K/UL (ref 0–0.2)
BUN BLDV-MCNC: 11 MG/DL (ref 8–23)
BUN/CREAT BLD: 15 (ref 9–20)
CALCIUM SERPL-MCNC: 9.4 MG/DL (ref 8.6–10.4)
CHLORIDE BLD-SCNC: 101 MMOL/L (ref 98–107)
CO2: 28 MMOL/L (ref 20–31)
CREAT SERPL-MCNC: 0.71 MG/DL (ref 0.7–1.2)
EOSINOPHILS RELATIVE PERCENT: 2 % (ref 1–4)
GFR AFRICAN AMERICAN: >60 ML/MIN
GFR NON-AFRICAN AMERICAN: >60 ML/MIN
GFR SERPL CREATININE-BSD FRML MDRD: ABNORMAL ML/MIN/{1.73_M2}
GLUCOSE BLD-MCNC: 203 MG/DL (ref 70–99)
HCT VFR BLD CALC: 43.7 % (ref 40.7–50.3)
HEMOGLOBIN: 14.5 G/DL (ref 13–17)
IMMATURE GRANULOCYTES: 0 %
LYMPHOCYTES # BLD: 21 % (ref 24–43)
MCH RBC QN AUTO: 29 PG (ref 25.2–33.5)
MCHC RBC AUTO-ENTMCNC: 33.2 G/DL (ref 25.2–33.5)
MCV RBC AUTO: 87.4 FL (ref 82.6–102.9)
MONOCYTES # BLD: 11 % (ref 3–12)
NRBC AUTOMATED: 0 PER 100 WBC
PDW BLD-RTO: 13.2 % (ref 11.8–14.4)
PLATELET # BLD: 270 K/UL (ref 138–453)
PMV BLD AUTO: 10.8 FL (ref 8.1–13.5)
POTASSIUM SERPL-SCNC: 4 MMOL/L (ref 3.7–5.3)
RBC # BLD: 5 M/UL (ref 4.21–5.77)
SEG NEUTROPHILS: 66 % (ref 36–65)
SEGMENTED NEUTROPHILS ABSOLUTE COUNT: 6.56 K/UL (ref 1.5–8.1)
SODIUM BLD-SCNC: 139 MMOL/L (ref 135–144)
WBC # BLD: 10.1 K/UL (ref 3.5–11.3)

## 2022-09-07 PROCEDURE — 80048 BASIC METABOLIC PNL TOTAL CA: CPT

## 2022-09-07 PROCEDURE — 94760 N-INVAS EAR/PLS OXIMETRY 1: CPT

## 2022-09-07 PROCEDURE — 6370000000 HC RX 637 (ALT 250 FOR IP): Performed by: FAMILY MEDICINE

## 2022-09-07 PROCEDURE — 2580000003 HC RX 258: Performed by: EMERGENCY MEDICINE

## 2022-09-07 PROCEDURE — 6360000002 HC RX W HCPCS: Performed by: NURSE PRACTITIONER

## 2022-09-07 PROCEDURE — 6370000000 HC RX 637 (ALT 250 FOR IP): Performed by: NURSE PRACTITIONER

## 2022-09-07 PROCEDURE — 6370000000 HC RX 637 (ALT 250 FOR IP): Performed by: INTERNAL MEDICINE

## 2022-09-07 PROCEDURE — 94640 AIRWAY INHALATION TREATMENT: CPT

## 2022-09-07 PROCEDURE — 6360000002 HC RX W HCPCS: Performed by: EMERGENCY MEDICINE

## 2022-09-07 PROCEDURE — 99238 HOSP IP/OBS DSCHRG MGMT 30/<: CPT | Performed by: INTERNAL MEDICINE

## 2022-09-07 PROCEDURE — 85025 COMPLETE CBC W/AUTO DIFF WBC: CPT

## 2022-09-07 PROCEDURE — 36415 COLL VENOUS BLD VENIPUNCTURE: CPT

## 2022-09-07 RX ORDER — GREEN TEA/HOODIA GORDONII 315-12.5MG
1 CAPSULE ORAL 3 TIMES DAILY
Qty: 30 TABLET | Refills: 0 | COMMUNITY
Start: 2022-09-07 | End: 2022-09-17

## 2022-09-07 RX ORDER — HYDROCODONE BITARTRATE AND ACETAMINOPHEN 5; 325 MG/1; MG/1
1 TABLET ORAL EVERY 6 HOURS PRN
Qty: 12 TABLET | Refills: 0 | Status: SHIPPED | OUTPATIENT
Start: 2022-09-07 | End: 2022-09-07 | Stop reason: SDUPTHER

## 2022-09-07 RX ORDER — DOXYCYCLINE HYCLATE 100 MG
100 TABLET ORAL 2 TIMES DAILY
Qty: 20 TABLET | Refills: 0 | Status: SHIPPED | OUTPATIENT
Start: 2022-09-07 | End: 2022-09-07 | Stop reason: SDUPTHER

## 2022-09-07 RX ORDER — HYDROCODONE BITARTRATE AND ACETAMINOPHEN 5; 325 MG/1; MG/1
1 TABLET ORAL EVERY 6 HOURS PRN
Qty: 12 TABLET | Refills: 0 | Status: SHIPPED | OUTPATIENT
Start: 2022-09-07 | End: 2022-09-10

## 2022-09-07 RX ORDER — SULFAMETHOXAZOLE AND TRIMETHOPRIM 800; 160 MG/1; MG/1
1 TABLET ORAL 2 TIMES DAILY
Qty: 20 TABLET | Refills: 0 | Status: SHIPPED | OUTPATIENT
Start: 2022-09-07 | End: 2022-09-17

## 2022-09-07 RX ORDER — DOXYCYCLINE HYCLATE 100 MG
100 TABLET ORAL 2 TIMES DAILY
Qty: 20 TABLET | Refills: 0 | Status: SHIPPED | OUTPATIENT
Start: 2022-09-07 | End: 2022-09-17

## 2022-09-07 RX ORDER — SULFAMETHOXAZOLE AND TRIMETHOPRIM 800; 160 MG/1; MG/1
1 TABLET ORAL 2 TIMES DAILY
Qty: 20 TABLET | Refills: 0 | Status: SHIPPED | OUTPATIENT
Start: 2022-09-07 | End: 2022-09-07 | Stop reason: SDUPTHER

## 2022-09-07 RX ADMIN — INSULIN GLARGINE 7 UNITS: 100 INJECTION, SOLUTION SUBCUTANEOUS at 07:48

## 2022-09-07 RX ADMIN — VANCOMYCIN HYDROCHLORIDE 1000 MG: 1 INJECTION, POWDER, LYOPHILIZED, FOR SOLUTION INTRAVENOUS at 04:39

## 2022-09-07 RX ADMIN — SODIUM CHLORIDE 1 G: 1 TABLET ORAL at 07:48

## 2022-09-07 RX ADMIN — INSULIN LISPRO 2 UNITS: 100 INJECTION, SOLUTION INTRAVENOUS; SUBCUTANEOUS at 07:47

## 2022-09-07 RX ADMIN — INSULIN LISPRO 5 UNITS: 100 INJECTION, SOLUTION INTRAVENOUS; SUBCUTANEOUS at 07:48

## 2022-09-07 RX ADMIN — CITALOPRAM HYDROBROMIDE 10 MG: 20 TABLET ORAL at 07:48

## 2022-09-07 RX ADMIN — ENOXAPARIN SODIUM 40 MG: 100 INJECTION SUBCUTANEOUS at 07:48

## 2022-09-07 RX ADMIN — MOMETASONE FUROATE AND FORMOTEROL FUMARATE DIHYDRATE 2 PUFF: 100; 5 AEROSOL RESPIRATORY (INHALATION) at 08:13

## 2022-09-07 RX ADMIN — HYDROCODONE BITARTRATE AND ACETAMINOPHEN 1 TABLET: 5; 325 TABLET ORAL at 05:20

## 2022-09-07 RX ADMIN — IPRATROPIUM BROMIDE AND ALBUTEROL SULFATE 1 AMPULE: 2.5; .5 SOLUTION RESPIRATORY (INHALATION) at 08:13

## 2022-09-07 ASSESSMENT — PAIN SCALES - GENERAL
PAINLEVEL_OUTOF10: 0
PAINLEVEL_OUTOF10: 6

## 2022-09-07 ASSESSMENT — PAIN DESCRIPTION - ORIENTATION: ORIENTATION: RIGHT

## 2022-09-07 ASSESSMENT — PAIN - FUNCTIONAL ASSESSMENT: PAIN_FUNCTIONAL_ASSESSMENT: ACTIVITIES ARE NOT PREVENTED

## 2022-09-07 ASSESSMENT — PAIN DESCRIPTION - LOCATION: LOCATION: ELBOW

## 2022-09-07 ASSESSMENT — PAIN DESCRIPTION - DESCRIPTORS: DESCRIPTORS: SORE

## 2022-09-07 NOTE — DISCHARGE INSTR - DIET
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
## 2022-09-07 NOTE — PROGRESS NOTES
Discharge instructions reviewed with patient, patient verbalizes understanding. Medications sent to [facility].  Referrals sent for follow up visits, patient aware providers will call him to schedule. Denies questions.

## 2022-09-07 NOTE — TELEPHONE ENCOUNTER
Patient to be discharged today, from [facility]. Admitted for cellulitis of the right elbow and hyperglycemia.
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
## 2022-09-08 NOTE — TELEPHONE ENCOUNTER
[facility] Transitions Initial Follow Up Call    Outreach made within 2 business days of discharge: Yes    Patient: Krishna Trent Patient : 1955   MRN: [id number]  Reason for Admission: Cellulitis of right elbow  Discharge Date: 22       Spoke with: Kamila Pinto    Discharge department/facility: [facility] Interactive Patient Contact:  Was patient able to fill all prescriptions: Yes  Was patient instructed to bring all medications to the follow-up visit: Yes  Is patient taking all medications as directed in the discharge summary? Yes  Does patient understand their discharge instructions: Yes  Does patient have questions or concerns that need addressed prior to 7-14 day follow up office visit: no    Scheduled appointment with PCP within 7-14 days  Patient refused an appointment with Pcp at this time.    Follow Up  Future Appointments   Date Time Provider Department Center   2022 11:45 AM Genesis Toledo MD [address] [facility]       Claudia Casas LPN

## 2022-09-08 NOTE — PROGRESS NOTES
Physician Progress Note      PATIENT:               Marco Guerrero  CSN #:                  [id number]  :                       1955  ADMIT DATE:       2022 2:18 PM  [address] DATE:        2022 10:47 AM  RESPONDING  PROVIDER #:        Channing Cedeno MD          QUERY TEXT:    Pt admitted with RUE cellulitis. Pt noted to have DM 2. If possible, please   document in progress notes and discharge summary the relationship, if any,   between cellulitis and DM. The medical record reflects the following:  Risk Factors: RUE cellulitis, DM 2 , reported noncompliance  Clinical Indicators: hyperglycemia on presentation -> Glucose 426-> 265-> 227   . Skin:  Right arm with area of erythema and excessive warmth from forearm   extending to mid upper arm . admits to noncompliance with prescribed home   medications -- states he ran out and never followed up. Treatment: 2L IVF bolus, IV Vanco,  carb controlled diet, insulins. A1C   pending  Options provided:  -- RUE cellulitis associated with Diabetes  -- RUE cellulitis unrelated to Diabetes  -- Other - I will add my own diagnosis  -- Disagree - Not applicable / Not valid  -- Disagree - Clinically unable to determine / Unknown  -- Refer to Clinical Documentation Reviewer    PROVIDER RESPONSE TEXT:    RUE cellulitis unrelated to Diabetes. Query created by: Hang Almazan on 2022 9:58 AM      QUERY TEXT:    Pt admitted with RUE cellulitis . Pt noted to have Elevated WBC, Lactic, CRP . If possible, please document in the progress notes and discharge summary if   you are evaluating and /or treating any of the following: The medical record reflects the following:  Risk Factors: RUE cellulitis  Clinical Indicators: presents with R. Sided arm pain, redness and swelling. WBC 15.7->12.1-> 11.8 ; Lactic 9/4 3.1-> 1.8 ;  .5 .   Treatment: 2L IVF bolus, IV Vancomycin    Nikki Sevilla  Options provided:  -- Sepsis, present on admission  -- Sepsis was ruled out  -- Other - I will add my own diagnosis  -- Disagree - Not applicable / Not valid  -- Disagree - Clinically unable to determine / Unknown  -- Refer to Clinical Documentation Reviewer    PROVIDER RESPONSE TEXT:    This patient has sepsis which was present on admission.     Query created by: Madeleine Arthur on 9/6/2022 10:03 AM      Electronically signed by:  Sylvania Moritz MD 9/7/2022 9:53 PM 7

## 2022-09-10 LAB
CULTURE: NORMAL
CULTURE: NORMAL
SPECIMEN DESCRIPTION: NORMAL
SPECIMEN DESCRIPTION: NORMAL

## 2022-09-14 DIAGNOSIS — L03.113 CELLULITIS OF RIGHT ELBOW: Primary | ICD-10-CM

## 2022-10-05 ENCOUNTER — TELEPHONE (OUTPATIENT)
Dept: INTERNAL MEDICINE | Age: 67
End: 2022-10-05

## 2022-10-05 NOTE — TELEPHONE ENCOUNTER
[facility] called office yesterday to schedule a post hospital appt (was being discharged 10/04/22). Appt was scheduled with Lisa Stern CNP for 10/10/22 (60 min- Melany Devine has never seen pt). Spoke with pt to complete a Transitional Care Call. Pt is still in [facility] pt to call our office when he is home from the hospital. Pt voices understanding.

## 2022-10-06 NOTE — TELEPHONE ENCOUNTER
Attempted to call pt (to check if discharged yet)- no answer, no option to leave message (voicemail not set up).

## 2022-10-07 NOTE — TELEPHONE ENCOUNTER
Patient is still in the hospital. He thinks he is going to be discharged later today.  Patient was told to call us when he is discharged from hospital.

## 2022-10-10 ENCOUNTER — OFFICE VISIT (OUTPATIENT)
Dept: INTERNAL MEDICINE | Age: 67
End: 2022-10-10

## 2022-10-10 ENCOUNTER — TELEPHONE (OUTPATIENT)
Dept: INTERNAL MEDICINE | Age: 67
End: 2022-10-10

## 2022-10-10 VITALS
OXYGEN SATURATION: 94 % | DIASTOLIC BLOOD PRESSURE: 72 MMHG | HEIGHT: 68 IN | RESPIRATION RATE: 20 BRPM | BODY MASS INDEX: 23.04 KG/M2 | SYSTOLIC BLOOD PRESSURE: 104 MMHG | HEART RATE: 84 BPM | TEMPERATURE: 98.9 F | WEIGHT: 152 LBS

## 2022-10-10 DIAGNOSIS — E11.65 TYPE 2 DIABETES MELLITUS WITH HYPERGLYCEMIA, UNSPECIFIED WHETHER LONG TERM INSULIN USE (HCC): ICD-10-CM

## 2022-10-10 DIAGNOSIS — A41.9 SEPSIS, DUE TO UNSPECIFIED ORGANISM, UNSPECIFIED WHETHER ACUTE ORGAN DYSFUNCTION PRESENT (HCC): Primary | ICD-10-CM

## 2022-10-10 DIAGNOSIS — J44.9 CHRONIC OBSTRUCTIVE PULMONARY DISEASE, UNSPECIFIED COPD TYPE (HCC): ICD-10-CM

## 2022-10-10 DIAGNOSIS — Z09 HOSPITAL DISCHARGE FOLLOW-UP: ICD-10-CM

## 2022-10-10 DIAGNOSIS — M48.02 CERVICAL STENOSIS OF SPINAL CANAL: ICD-10-CM

## 2022-10-10 DIAGNOSIS — K21.9 GASTROESOPHAGEAL REFLUX DISEASE WITHOUT ESOPHAGITIS: ICD-10-CM

## 2022-10-10 DIAGNOSIS — L03.113 CELLULITIS OF RIGHT ELBOW: ICD-10-CM

## 2022-10-10 DIAGNOSIS — B95.7 STAPHYLOCOCCUS EPIDERMIDIS BACTEREMIA: ICD-10-CM

## 2022-10-10 DIAGNOSIS — F41.9 ANXIETY: ICD-10-CM

## 2022-10-10 DIAGNOSIS — G95.20 SPINAL CORD COMPRESSION (HCC): ICD-10-CM

## 2022-10-10 DIAGNOSIS — R78.81 STAPHYLOCOCCUS EPIDERMIDIS BACTEREMIA: ICD-10-CM

## 2022-10-10 DIAGNOSIS — E78.5 HYPERLIPIDEMIA, UNSPECIFIED HYPERLIPIDEMIA TYPE: ICD-10-CM

## 2022-10-10 DIAGNOSIS — M70.21 OLECRANON BURSITIS OF RIGHT ELBOW: ICD-10-CM

## 2022-10-10 PROBLEM — E11.9 TYPE II DIABETES MELLITUS (HCC): Status: ACTIVE | Noted: 2022-09-04

## 2022-10-10 PROBLEM — G89.29 CHRONIC BACK PAIN: Status: ACTIVE | Noted: 2022-09-23

## 2022-10-10 PROBLEM — M54.9 CHRONIC BACK PAIN: Status: ACTIVE | Noted: 2022-09-23

## 2022-10-10 PROBLEM — M54.50 LOW BACK PAIN: Status: ACTIVE | Noted: 2022-09-23

## 2022-10-10 RX ORDER — OXYCODONE HCL 20 MG/1
20 TABLET, FILM COATED, EXTENDED RELEASE ORAL EVERY 12 HOURS
COMMUNITY
Start: 2022-10-07 | End: 2022-10-14

## 2022-10-10 RX ORDER — DIAZEPAM 5 MG/1
2.5 TABLET ORAL EVERY 8 HOURS
COMMUNITY
Start: 2022-10-07 | End: 2022-10-10 | Stop reason: SDUPTHER

## 2022-10-10 RX ORDER — INSULIN GLARGINE 100 [IU]/ML
INJECTION, SOLUTION SUBCUTANEOUS
COMMUNITY
Start: 2022-09-22 | End: 2022-10-13 | Stop reason: ALTCHOICE

## 2022-10-10 RX ORDER — DIAZEPAM 5 MG/1
2.5 TABLET ORAL EVERY 8 HOURS
Qty: 15 TABLET | Refills: 0 | Status: SHIPPED | OUTPATIENT
Start: 2022-10-10 | End: 2022-10-20

## 2022-10-10 RX ORDER — INSULIN LISPRO 100 [IU]/ML
INJECTION, SUSPENSION SUBCUTANEOUS
COMMUNITY
Start: 2022-10-07 | End: 2022-10-13

## 2022-10-10 RX ORDER — VANCOMYCIN HYDROCHLORIDE 10 G/100ML
INJECTION, POWDER, LYOPHILIZED, FOR SOLUTION INTRAVENOUS EVERY 8 HOURS
COMMUNITY
Start: 2022-10-07

## 2022-10-10 ASSESSMENT — PATIENT HEALTH QUESTIONNAIRE - PHQ9
SUM OF ALL RESPONSES TO PHQ9 QUESTIONS 1 & 2: 0
1. LITTLE INTEREST OR PLEASURE IN DOING THINGS: 0
SUM OF ALL RESPONSES TO PHQ QUESTIONS 1-9: 0
2. FEELING DOWN, DEPRESSED OR HOPELESS: 0
SUM OF ALL RESPONSES TO PHQ QUESTIONS 1-9: 0

## 2022-10-10 NOTE — TELEPHONE ENCOUNTER
Spoke with  at [facility]- pt was discharged 10/07/22. Requested STAT records ([phone]/ Discharge Summary, H&P, Consult notes, any testing).

## 2022-10-10 NOTE — TELEPHONE ENCOUNTER
Confirmed with [facility] ([facility] [phone])-  pt had CBC and BMP drawn today. Results will be processed at [facility] lab. Daughter Davey Vaughan) to call office with names of Surgeon and Infectious Disease physicians, and dates of upcoming appts.

## 2022-10-10 NOTE — PROGRESS NOTES
Post-Discharge Transitional Care Follow Up      Karen Oseguera   YOB: 1955    Date of Office Visit:  10/10/2022  Date of Hospital Admission: 9/4/22  Date of Hospital Discharge: 9/7/22  Readmission Risk Score (high >=14%. Medium >=10%):Readmission Risk Score: 6.6      Care management risk score Rising risk (score 2-5) and Complex Care (Scores >=6): No Risk Score On File     Non face to face  following discharge, date last encounter closed (first attempt may have been earlier): *No documented post hospital discharge outreach found in the last 14 days Patient discharged 10/07/22, telephone calls documented 10/06, 10/07, and 10/10    Call initiated 2 business days of discharge: *No response recorded in the last 14 days As above    Sepsis, due to unspecified organism, unspecified whether acute organ dysfunction present ([facility] [address]  -     CBC with Auto Differential; Future  -     Comprehensive Metabolic Panel; Future  Staphylococcus epidermidis bacteremia  Spinal cord compression (HCC)  Cervical stenosis of spinal canal  Olecranon bursitis of right elbow  Cellulitis of right elbow  Type 2 diabetes mellitus with hyperglycemia, unspecified whether long term insulin use (HCC)  -     metFORMIN (GLUCOPHAGE) 1000 MG tablet; Take 1 tablet by mouth 2 times daily (with meals), Disp-180 tablet, R-1Normal  Gastroesophageal reflux disease without esophagitis  Anxiety  -     diazePAM (VALIUM) 5 MG tablet; Take 0.5 tablets by mouth in the morning and 0.5 tablets at noon and 0.5 tablets in the evening. Do all this for 10 days. , Disp-15 tablet, R-0Normal  Chronic obstructive pulmonary disease, unspecified COPD type [facility])  Hospital discharge follow-up  -     ME DISCHARGE MEDS RECONCILED W/ CURRENT OUTPATIENT MED LIST  Hyperlipidemia, unspecified hyperlipidemia type      Medical Decision Making: moderate complexity  Return in about 2 weeks (around 10/24/2022).            Subjective:   HPI    Inpatient course: Discharge summary reviewed- see chart. Patient presents to establish care and for transition of care visit following hospitalization for sepsis. Chief complaint upon admission was recurrent right elbow cellulitis and new onset of bilateral upper extremity and lower extremity weakness. Noticed diffuse body weakness 1 to 2 days prior to ER visit. Lumbar spine noted concerning for large disc herniation and stenosis. He was started on vancomycin and surgery was consulted. Saw patient and recommended infectious disease consult for antibiotic planning orthopedic surgery consult for cellulitis. Neurosurgery was consulted and plan for C2-T1 laminectomy and fusion. Orthopedic surgery was consulted regarding right elbow bursitis which aspirated the patient's right olecranon bursa infectious disease recommended to discontinue vancomycin as elbow seem to have a low probability of infection. Endocrinology was consulted for newly diagnosed diabetes, A1c 11%. Patient was started on metformin. Infectious disease was reconsulted due to leukocytosis with elevated ANC and temperature of 100.2 while on Tylenol and ongoing severe pain. Patient underwent I&D of cervical seroma. Continues on vancomycin via PICC with assistance of home health per discharge orders. Patient underwent C2-T1 decompression/fusion for severe canal stenosis, seroma evacuation, right olecranon bursitis requiring aspiration, and S epidermidis bacteremia. Infectious disease recommended ongoing IV vancomycin via PICC for 6 weeks in addition to oral rifampin. GABY was completed 10/5 ruled out any vegetations. He was discharged on oxycodone for postoperative pain per neurosurgery. New onset diabetes noted during admission; patient was discharged on metformin Humalog 75/25 and lispro per endocrinology. BG has been in the 200's.  Has not been taking insulin or metformin due to cost. Discussed restarting metformin as this should be relatively inexpensive and can work on patient assistance for insulin. Was advised to continue diazepam for anxiety. He was previously on celexa, but has not been taking this recently. He has considerable anxiety due to his health and due to the recent loss of his daughter. Was discharged on pantoprazole for heartburn, but has not been taking this due to cost.     Was discharged with spriva and symbicort for COPD, but was unable to start this due to cost.     Interval history/Current status: stable    Patient Active Problem List   Diagnosis    DDD (degenerative disc disease)    Chronic back pain    Lumbar disc displacement without myelopathy    Lumbago    Thoracic or lumbosacral neuritis or radiculitis, unspecified    Cellulitis of right elbow    Type II diabetes mellitus ([facility] [address]    Tobacco use    COPD (chronic obstructive pulmonary disease) ([facility]    Sepsis ([facility] [address]    Spinal cord compression ([address]    Low back pain    GERD (gastroesophageal reflux disease)    Anxiety    Hyperlipidemia       Medications listed as ordered at the time of discharge from hospital     Medication List            Accurate as of October 10, 2022 11:59 PM. If you have any questions, ask your nurse or doctor.                 START taking these medications      metFORMIN 1000 MG tablet  Commonly known as: GLUCOPHAGE  Take 1 tablet by mouth 2 times daily (with meals)  Started by: YASIR Willard CNP            CONTINUE taking these medications      albuterol sulfate  (90 Base) MCG/ACT inhaler  Commonly known as: Ventolin HFA  Inhale 2 puffs into the lungs 4 times daily as needed for Wheezing     atorvastatin 40 MG tablet  Commonly known as: LIPITOR  Take 1 tablet by mouth nightly     budesonide-formoterol 80-4.5 MCG/ACT Aero  Commonly known as: SYMBICORT     citalopram 10 MG tablet  Commonly known as: CeleXA  Take 1 tablet by mouth daily     clonazePAM 0.5 MG tablet  Commonly known as: KlonoPIN  Take 1 tablet by mouth 2 times daily as needed     diazePAM 5 MG tablet  Commonly known as: VALIUM  Take 0.5 tablets by mouth in the morning and 0.5 tablets at noon and 0.5 tablets in the evening. Do all this for 10 days. insulin lispro protamine & lispro (75-25) 100 UNIT per ML Supn injection pen  Commonly known as: HUMALOG MIX     Lantus SoloStar 100 UNIT/ML injection pen  Generic drug: insulin glargine     nicotine 21 MG/24HR  Commonly known as: NICODERM CQ     oxyCODONE 20 MG extended release tablet  Commonly known as: OXYCONTIN     tiotropium 18 MCG inhalation capsule  Commonly known as: SPIRIVA     Vancomycin HCl 10 g Solr               Where to Get Your Medications        These medications were sent to [address] [place] [address] [address], [facility] [address]  [facility] [address]      Phone: [phone]   diazePAM 5 MG tablet  metFORMIN 1000 MG tablet          Medications marked \"taking\" at this time  Outpatient Medications Marked as Taking for the 10/10/22 encounter (Office Visit) with YASIR Bah - CNP   Medication Sig Dispense Refill    oxyCODONE (OXYCONTIN) 20 MG extended release tablet Take 20 mg by mouth in the morning and 20 mg in the evening. metFORMIN (GLUCOPHAGE) 1000 MG tablet Take 1 tablet by mouth 2 times daily (with meals) 180 tablet 1    diazePAM (VALIUM) 5 MG tablet Take 0.5 tablets by mouth in the morning and 0.5 tablets at noon and 0.5 tablets in the evening. Do all this for 10 days. 15 tablet 0        Medications patient taking as of now reconciled against medications ordered at time of hospital discharge: Yes      Review of Systems   Constitutional:  Negative for chills and fever. HENT:  Negative for congestion and sore throat. Respiratory:  Negative for chest tightness and shortness of breath. Cardiovascular:  Negative for chest pain and leg swelling. Gastrointestinal:  Negative for diarrhea, nausea and vomiting.    Genitourinary:  Negative for difficulty urinating and dysuria. Musculoskeletal:  Positive for back pain and neck pain. Neurological:  Negative for dizziness and headaches. Psychiatric/Behavioral:  Negative for confusion and decreased concentration. Objective:    /72 (Site: Left Upper Arm, Position: Sitting, Cuff Size: Medium Adult)   Pulse 84   Temp 98.9 °F (37.2 °C)   Resp 20   Ht 5' 8\" (1.727 m)   Wt 152 lb (68.9 kg)   SpO2 94% Comment: room air  BMI 23.11 kg/m²   Physical Exam  Vitals and nursing note reviewed. Constitutional:       General: He is not in acute distress. Appearance: He is well-developed. HENT:      Head: Normocephalic and atraumatic. Right Ear: External ear normal.      Left Ear: External ear normal.   Eyes:      General: Lids are normal.      Extraocular Movements: Extraocular movements intact. Conjunctiva/sclera: Conjunctivae normal.   Neck:      Thyroid: No thyromegaly. Comments: Dressing in place over cervical spine, incision site appears well approximated without signs of infection  Cardiovascular:      Rate and Rhythm: Normal rate and regular rhythm. Heart sounds: No murmur heard. Pulmonary:      Effort: Pulmonary effort is normal. No accessory muscle usage or respiratory distress. Breath sounds: Normal breath sounds. No wheezing, rhonchi or rales. Abdominal:      Palpations: Abdomen is soft. Tenderness: There is no abdominal tenderness. There is no guarding or rebound. Musculoskeletal:      Cervical back: Neck supple. Right lower leg: No edema. Left lower leg: No edema. Lymphadenopathy:      Cervical: No cervical adenopathy. Skin:     General: Skin is warm and dry. Nails: There is no clubbing. Comments: PICC line intact RUE   Neurological:      Mental Status: He is alert.       Coordination: Coordination normal.   Psychiatric:         Mood and Affect: Mood normal.         Speech: Speech normal.         Behavior: Behavior normal. Assessment and Plan:      1. Hospital discharge follow-up  - DC DISCHARGE MEDS RECONCILED W/ CURRENT OUTPATIENT MED LIST    2. Sepsis, due to unspecified organism, unspecified whether acute organ dysfunction present ([facility] resolved  3. Staphylococcus epidermidis bacteremia, resolved  - Continue to follow with infections disease. Ongoing antibiotics via PICC per discharge instructions. Labs as noted below  - CBC with Auto Differential; Future  - Comprehensive Metabolic Panel; Future    4. Spinal cord compression ([facility] stable  5. Cervical stenosis of spinal canal, stable  - Continue to follow with neurosurgery. Discussed ongoing postoperative pain management per neurosurgery. 6. Olecranon bursitis of right elbow, improving  7. Cellulitis of right elbow, improving.   - Continue to follow with infections disease. Ongoing antibiotics via PICC per discharge instructions. 8. Type 2 diabetes mellitus with hyperglycemia, unspecified whether long term insulin use ([facility] uncontrolled  - Restart metformin. Will look into patient assistance for insulin. Referral to Gricel Winkler NP for diabetes management  - metFORMIN (GLUCOPHAGE) 1000 MG tablet; Take 1 tablet by mouth 2 times daily (with meals)  Dispense: 180 tablet; Refill: 1    9. Gastroesophageal reflux disease without esophagitis,  stable  - Continue to monitor    10. Anxiety, stable  - Will continue diazepam at this time, with plans to transition to SSRI or similar once he has fewer stressors present   - diazePAM (VALIUM) 5 MG tablet; Take 0.5 tablets by mouth in the morning and 0.5 tablets at noon and 0.5 tablets in the evening. Do all this for 10 days. Dispense: 15 tablet; Refill: 0    11. Chronic obstructive pulmonary disease, unspecified COPD type ([facility]  stable  - Will plan to look into samples or patient assistance for symbicort and spiriva    12.  Hyperlipidemia, unspecified hyperlipidemia type, stable  - Patient has not been taking atorvastatin, advised to consider if cost permits      Electronically signed by YASIR Willard CNP on 10/12/2022 at 2:53 PM

## 2022-10-10 NOTE — TELEPHONE ENCOUNTER
Received call from daughter Mignon Waite) with a list of doctors, and appts:    1) Dr. Mayra Ruiz- Neurosurgery (ph [phone], fax [phone])- appt 10/20/22 at 9:30 am (staple removal), and 11/08/22 at 1:45 pm (post-hospital). Spoke with nurse (Gail)re pain  medication. States Juaquin Rolon was given a 7 day supply- Oxycontin 20 mg #14 on discharge (prescribed by Trauma Unit) to take one every 12 hours. We will not be giving him another script at this time. If he is out of the med, he is taking more than he should. \"  Pt's daughter says he only has one pill left for tonight. Pt is willing to take something else for pain. 2) Dr. Abhijit Nickerson (ph [phone])- appt to be scheduled in 2 weeks (daughter will call to schedule). 3) Dr. Refugio Sommer (ph [phone])- daughter to call schedule a follow up appt.     4) No specialist for Diabetes- she scheduled an appt for ROBERT Iqbal CNP on 11/02/22 at 10:30 am.

## 2022-10-11 NOTE — TELEPHONE ENCOUNTER
OARRS reviewed. He was given a 7 day supply of oxycontin ER on 10/07/22, therefore he should have had enough to last him until 10/14/22. The dose he is on is a very potent pain medication, and if he is already out of medication, he must have been taking double the prescribed dose, which is very dangerous - particularly in regards to concern for side effect such as respiratory depression and interaction with diazepam. How often has he been taking oxycontin? Did he not understand the dosing instructions? After clarifying with patient, can we please clarify with neurosurgery what they recommend for pain management for patient? If his pain is that severe that double his dose of oxycontin was inadequate to control the pain, he needs a follow up with neurosurgery or ER today.

## 2022-10-11 NOTE — TELEPHONE ENCOUNTER
Attempted to call daughter Christina Daly)- no answer; detailed message left on voicemail. Spoke with pt- confirms he only has one Oxycontin left. Pt admits to taking 1-2 tablets at a time (2 during the day, and 1 at night). His last dose was last night (1 tablet). Pt states \"one tablet just doesn't help my neck pain. \"  Instructed pt to call his neurosurgeon this am (asap), and explain how he has been taking the Oxycontin, and that it is not relieving his pain. Pt was given Dr. Nataliia Poe office number ([phone]). Informed pt that Juanpablo Kee is deferring pain med refills to neurosurgery due to above. Advised pt that there is a chance of overdosing on this medication if not taken as prescribed. Pt states \"I took 80 mg twice a day years ago for my back. \" Again, advised pt to call Dr. Nataliia Poe office asap, and to follow their recommendations. Also, instructed him to go to ER if pain is not bearable. Pt voices understanding, and will call Dr. Nataliia Poe office when he hangs up the phone.

## 2022-10-12 ASSESSMENT — ENCOUNTER SYMPTOMS
CHEST TIGHTNESS: 0
DIARRHEA: 0
BACK PAIN: 1
VOMITING: 0
NAUSEA: 0
SORE THROAT: 0
SHORTNESS OF BREATH: 0

## 2022-10-13 RX ORDER — FLASH GLUCOSE SENSOR
KIT MISCELLANEOUS
Qty: 2 EACH | Refills: 11 | Status: SHIPPED | OUTPATIENT
Start: 2022-10-13

## 2022-10-13 RX ORDER — FLASH GLUCOSE SCANNING READER
EACH MISCELLANEOUS
Qty: 1 EACH | Refills: 0 | Status: SHIPPED | OUTPATIENT
Start: 2022-10-13

## 2022-10-13 RX ORDER — INSULIN GLARGINE 300 U/ML
12 INJECTION, SOLUTION SUBCUTANEOUS NIGHTLY
COMMUNITY

## 2022-10-13 NOTE — TELEPHONE ENCOUNTER
Received fax from [address] Worker (Reginaldo Crocker ph: [phone]): \"Ananth has not been able to get his insulin due to cost. After insurance the cost is $250.00. he does have the Glucophage in the home. His blood sugars have been running high. ..between 280 and 400. Please contact patient's daughter Stephanie Epperson ([phone]) if you have samples of any insulin that could be substituted or another idea. \"    Med list shows Humalog Mix 75-25 12 units q am and 24 q pm; Lantus but no dose. Pt has appt with Malick Edmonds CNP 11/02/22. Reviewed situation with Renetta. She recommends any Basal insulin; start at 12 units q pm. We do have Toujeo samples available. Will offer to pt if you agree.

## 2022-10-13 NOTE — TELEPHONE ENCOUNTER
Instructed pt to check blood sugars fasting every am, and before lunch and supper. Renetta also suggests ordering a [address], and have pt bring to his appt on 11/02/22. Order pended if you agree.

## 2022-10-13 NOTE — TELEPHONE ENCOUNTER
Spoke with daughter Maurisio Ayala)- notified of samples; including pen needles. Instructions given to inject 12 units nightly, and to continue monitoring bs. Provided pt with blue Diabetic Log book. Instructed pt to bring readings to his upcoming appts Jazzmine Kevin on 10/31/22, and Renetta 11/02/22). Toujeo 300 units/ml #1 sample given; Lot [id number], exp 10/31/2023.

## 2022-10-14 RX ORDER — PEN NEEDLE, DIABETIC 32GX 5/32"
NEEDLE, DISPOSABLE MISCELLANEOUS
COMMUNITY
Start: 2022-10-11

## 2022-10-14 RX ORDER — OXYCODONE HYDROCHLORIDE AND ACETAMINOPHEN 5; 325 MG/1; MG/1
1 TABLET ORAL
COMMUNITY
Start: 2022-10-12

## 2022-10-14 RX ORDER — PANTOPRAZOLE SODIUM 40 MG/1
1 TABLET, DELAYED RELEASE ORAL
COMMUNITY
Start: 2022-10-11

## 2022-10-14 RX ORDER — HUMAN INSULIN 100 [IU]/ML
INJECTION, SUSPENSION SUBCUTANEOUS
COMMUNITY
Start: 2022-09-26

## 2022-10-17 ENCOUNTER — TELEPHONE (OUTPATIENT)
Dept: INTERNAL MEDICINE | Age: 67
End: 2022-10-17

## 2022-10-17 NOTE — TELEPHONE ENCOUNTER
Last appt: 10/10/2022  Next appt: 10/31/2022    Cici with [facility] called asking for order for PT and OT due to decrease in strength. Call [place] if Anibal De La Torre agrees to these orders [phone].

## 2022-10-17 NOTE — TELEPHONE ENCOUNTER
Spoke with [employer]. Agreed to [employer] through [employer] before new pt appt.  Ordered with [facility] medical.

## 2022-10-17 NOTE — TELEPHONE ENCOUNTER
Wang Augustin denied - can we see if we can have this or dexcom approved prior to appointment with [employer]

## 2022-10-31 ENCOUNTER — TELEPHONE (OUTPATIENT)
Dept: INTERNAL MEDICINE | Age: 67
End: 2022-10-31

## 2023-09-25 ENCOUNTER — APPOINTMENT (OUTPATIENT)
Dept: CT IMAGING | Age: 68
DRG: 728 | End: 2023-09-25
Payer: MEDICARE

## 2023-09-25 ENCOUNTER — HOSPITAL ENCOUNTER (INPATIENT)
Age: 68
LOS: 2 days | Discharge: HOME | DRG: 728 | End: 2023-09-27
Attending: EMERGENCY MEDICINE | Admitting: INTERNAL MEDICINE
Payer: MEDICARE

## 2023-09-25 DIAGNOSIS — N39.0 URINARY TRACT INFECTION WITHOUT HEMATURIA, SITE UNSPECIFIED: Primary | ICD-10-CM

## 2023-09-25 DIAGNOSIS — R74.8 ELEVATED LIVER ENZYMES: ICD-10-CM

## 2023-09-25 PROBLEM — R32 UNSPECIFIED URINARY INCONTINENCE: Status: ACTIVE | Noted: 2022-10-07

## 2023-09-25 PROBLEM — E11.65 TYPE 2 DIABETES MELLITUS WITH HYPERGLYCEMIA (HCC): Status: ACTIVE | Noted: 2022-10-07

## 2023-09-25 PROBLEM — R10.9 UNSPECIFIED ABDOMINAL PAIN: Status: ACTIVE | Noted: 2023-09-25

## 2023-09-25 LAB
ALBUMIN SERPL-MCNC: 3.4 G/DL (ref 3.5–5.2)
ALBUMIN SERPL-MCNC: 3.8 G/DL (ref 3.5–5.2)
ALBUMIN/GLOB SERPL: 1 {RATIO} (ref 1–2.5)
ALBUMIN/GLOB SERPL: 1.2 {RATIO} (ref 1–2.5)
ALP SERPL-CCNC: 355 U/L (ref 40–129)
ALP SERPL-CCNC: 395 U/L (ref 40–129)
ALT SERPL-CCNC: 371 U/L (ref 5–41)
ALT SERPL-CCNC: 449 U/L (ref 5–41)
AMYLASE SERPL-CCNC: 242 U/L (ref 28–100)
ANION GAP SERPL CALCULATED.3IONS-SCNC: 10 MMOL/L (ref 9–17)
ANION GAP SERPL CALCULATED.3IONS-SCNC: 11 MMOL/L (ref 9–17)
AST SERPL-CCNC: 288 U/L
AST SERPL-CCNC: 423 U/L
BACTERIA URNS QL MICRO: ABNORMAL
BASOPHILS # BLD: <0.03 K/UL (ref 0–0.2)
BASOPHILS # BLD: <0.03 K/UL (ref 0–0.2)
BASOPHILS NFR BLD: 0 % (ref 0–2)
BASOPHILS NFR BLD: 0 % (ref 0–2)
BILIRUB DIRECT SERPL-MCNC: 3.5 MG/DL
BILIRUB INDIRECT SERPL-MCNC: 0.5 MG/DL (ref 0–1)
BILIRUB SERPL-MCNC: 4 MG/DL (ref 0.3–1.2)
BILIRUB SERPL-MCNC: 4.1 MG/DL (ref 0.3–1.2)
BILIRUB UR QL STRIP: ABNORMAL
BUN SERPL-MCNC: 9 MG/DL (ref 8–23)
BUN SERPL-MCNC: 9 MG/DL (ref 8–23)
BUN/CREAT SERPL: 13 (ref 9–20)
BUN/CREAT SERPL: 15 (ref 9–20)
CALCIUM SERPL-MCNC: 8.6 MG/DL (ref 8.6–10.4)
CALCIUM SERPL-MCNC: 9.5 MG/DL (ref 8.6–10.4)
CHLORIDE SERPL-SCNC: 95 MMOL/L (ref 98–107)
CHLORIDE SERPL-SCNC: 98 MMOL/L (ref 98–107)
CLARITY UR: CLEAR
CO2 SERPL-SCNC: 26 MMOL/L (ref 20–31)
CO2 SERPL-SCNC: 29 MMOL/L (ref 20–31)
COLOR UR: YELLOW
CREAT SERPL-MCNC: 0.6 MG/DL (ref 0.7–1.2)
CREAT SERPL-MCNC: 0.7 MG/DL (ref 0.7–1.2)
EOSINOPHIL # BLD: 0.05 K/UL (ref 0–0.44)
EOSINOPHIL # BLD: 0.07 K/UL (ref 0–0.44)
EOSINOPHILS RELATIVE PERCENT: 0 % (ref 1–4)
EOSINOPHILS RELATIVE PERCENT: 1 % (ref 1–4)
EPI CELLS #/AREA URNS HPF: ABNORMAL /HPF (ref 0–5)
ERYTHROCYTE [DISTWIDTH] IN BLOOD BY AUTOMATED COUNT: 12.3 % (ref 11.8–14.4)
ERYTHROCYTE [DISTWIDTH] IN BLOOD BY AUTOMATED COUNT: 12.3 % (ref 11.8–14.4)
EST. AVERAGE GLUCOSE BLD GHB EST-MCNC: 280 MG/DL
GFR SERPL CREATININE-BSD FRML MDRD: >60 ML/MIN/1.73M2
GFR SERPL CREATININE-BSD FRML MDRD: >60 ML/MIN/1.73M2
GLOBULIN SER CALC-MCNC: 2.9 G/DL (ref 1.5–3.8)
GLUCOSE BLD-MCNC: 100 MG/DL (ref 75–110)
GLUCOSE BLD-MCNC: 187 MG/DL (ref 75–110)
GLUCOSE BLD-MCNC: 201 MG/DL (ref 75–110)
GLUCOSE BLD-MCNC: 253 MG/DL (ref 75–110)
GLUCOSE SERPL-MCNC: 235 MG/DL (ref 70–99)
GLUCOSE SERPL-MCNC: 305 MG/DL (ref 70–99)
GLUCOSE UR STRIP-MCNC: ABNORMAL MG/DL
HAV IGM SERPL QL IA: NONREACTIVE
HBA1C MFR BLD: 11.4 % (ref 4–6)
HBV CORE IGM SERPL QL IA: NONREACTIVE
HBV SURFACE AG SERPL QL IA: NONREACTIVE
HCT VFR BLD AUTO: 44.8 % (ref 40.7–50.3)
HCT VFR BLD AUTO: 49.9 % (ref 40.7–50.3)
HCV AB SERPL QL IA: REACTIVE
HGB BLD-MCNC: 15 G/DL (ref 13–17)
HGB BLD-MCNC: 16.6 G/DL (ref 13–17)
HGB UR QL STRIP.AUTO: ABNORMAL
IMM GRANULOCYTES # BLD AUTO: 0.04 K/UL (ref 0–0.3)
IMM GRANULOCYTES # BLD AUTO: 0.05 K/UL (ref 0–0.3)
IMM GRANULOCYTES NFR BLD: 0 %
IMM GRANULOCYTES NFR BLD: 0 %
INR PPP: 1
KETONES UR STRIP-MCNC: NEGATIVE MG/DL
LEUKOCYTE ESTERASE UR QL STRIP: ABNORMAL
LIPASE SERPL-CCNC: 684 U/L (ref 13–60)
LYMPHOCYTES NFR BLD: 0.85 K/UL (ref 1.1–3.7)
LYMPHOCYTES NFR BLD: 1.16 K/UL (ref 1.1–3.7)
LYMPHOCYTES RELATIVE PERCENT: 10 % (ref 24–43)
LYMPHOCYTES RELATIVE PERCENT: 6 % (ref 24–43)
MAGNESIUM SERPL-MCNC: 1.6 MG/DL (ref 1.6–2.6)
MCH RBC QN AUTO: 28.9 PG (ref 25.2–33.5)
MCH RBC QN AUTO: 29.5 PG (ref 25.2–33.5)
MCHC RBC AUTO-ENTMCNC: 33.3 G/DL (ref 25.2–33.5)
MCHC RBC AUTO-ENTMCNC: 33.5 G/DL (ref 25.2–33.5)
MCV RBC AUTO: 86.8 FL (ref 82.6–102.9)
MCV RBC AUTO: 88 FL (ref 82.6–102.9)
MONOCYTES NFR BLD: 1.34 K/UL (ref 0.1–1.2)
MONOCYTES NFR BLD: 1.42 K/UL (ref 0.1–1.2)
MONOCYTES NFR BLD: 10 % (ref 3–12)
MONOCYTES NFR BLD: 12 % (ref 3–12)
MUCOUS THREADS URNS QL MICRO: ABNORMAL
NEUTROPHILS NFR BLD: 77 % (ref 36–65)
NEUTROPHILS NFR BLD: 84 % (ref 36–65)
NEUTS SEG NFR BLD: 11.98 K/UL (ref 1.5–8.1)
NEUTS SEG NFR BLD: 8.99 K/UL (ref 1.5–8.1)
NITRITE UR QL STRIP: NEGATIVE
NRBC BLD-RTO: 0 PER 100 WBC
PARTIAL THROMBOPLASTIN TIME: 23.9 SEC (ref 23.9–33.8)
PH UR STRIP: 6 [PH] (ref 5–6)
PLATELET # BLD AUTO: 222 K/UL (ref 138–453)
PLATELET # BLD AUTO: 228 K/UL (ref 138–453)
PMV BLD AUTO: 10.5 FL (ref 8.1–13.5)
PMV BLD AUTO: 10.9 FL (ref 8.1–13.5)
POTASSIUM SERPL-SCNC: 3.4 MMOL/L (ref 3.7–5.3)
POTASSIUM SERPL-SCNC: 4.6 MMOL/L (ref 3.7–5.3)
PROT SERPL-MCNC: 6.3 G/DL (ref 6.4–8.3)
PROT SERPL-MCNC: 7.5 G/DL (ref 6.4–8.3)
PROT UR STRIP-MCNC: ABNORMAL MG/DL
PROTHROMBIN TIME: 13 SEC (ref 11.5–14.2)
RBC # BLD AUTO: 5.09 M/UL (ref 4.21–5.77)
RBC # BLD AUTO: 5.75 M/UL (ref 4.21–5.77)
RBC #/AREA URNS HPF: ABNORMAL /HPF (ref 0–4)
SARS-COV-2 RDRP RESP QL NAA+PROBE: NOT DETECTED
SODIUM SERPL-SCNC: 134 MMOL/L (ref 135–144)
SODIUM SERPL-SCNC: 135 MMOL/L (ref 135–144)
SP GR UR STRIP: 1.02 (ref 1.01–1.02)
SPECIMEN DESCRIPTION: NORMAL
UROBILINOGEN UR STRIP-ACNC: NORMAL EU/DL (ref 0–1)
WBC #/AREA URNS HPF: ABNORMAL /HPF (ref 0–4)
WBC OTHER # BLD: 11.6 K/UL (ref 3.5–11.3)
WBC OTHER # BLD: 14.4 K/UL (ref 3.5–11.3)
YEAST URNS QL MICRO: ABNORMAL

## 2023-09-25 PROCEDURE — 6370000000 HC RX 637 (ALT 250 FOR IP)

## 2023-09-25 PROCEDURE — 83735 ASSAY OF MAGNESIUM: CPT

## 2023-09-25 PROCEDURE — 6360000002 HC RX W HCPCS: Performed by: EMERGENCY MEDICINE

## 2023-09-25 PROCEDURE — 80053 COMPREHEN METABOLIC PANEL: CPT

## 2023-09-25 PROCEDURE — 85610 PROTHROMBIN TIME: CPT

## 2023-09-25 PROCEDURE — 80076 HEPATIC FUNCTION PANEL: CPT

## 2023-09-25 PROCEDURE — APPSS30 APP SPLIT SHARED TIME 16-30 MINUTES

## 2023-09-25 PROCEDURE — 85025 COMPLETE CBC W/AUTO DIFF WBC: CPT

## 2023-09-25 PROCEDURE — 6370000000 HC RX 637 (ALT 250 FOR IP): Performed by: INTERNAL MEDICINE

## 2023-09-25 PROCEDURE — 96365 THER/PROPH/DIAG IV INF INIT: CPT

## 2023-09-25 PROCEDURE — 82150 ASSAY OF AMYLASE: CPT

## 2023-09-25 PROCEDURE — 82947 ASSAY GLUCOSE BLOOD QUANT: CPT

## 2023-09-25 PROCEDURE — 6370000000 HC RX 637 (ALT 250 FOR IP): Performed by: EMERGENCY MEDICINE

## 2023-09-25 PROCEDURE — 2580000003 HC RX 258

## 2023-09-25 PROCEDURE — 87086 URINE CULTURE/COLONY COUNT: CPT

## 2023-09-25 PROCEDURE — 2700000000 HC OXYGEN THERAPY PER DAY

## 2023-09-25 PROCEDURE — 6360000002 HC RX W HCPCS: Performed by: INTERNAL MEDICINE

## 2023-09-25 PROCEDURE — 99222 1ST HOSP IP/OBS MODERATE 55: CPT | Performed by: INTERNAL MEDICINE

## 2023-09-25 PROCEDURE — 94640 AIRWAY INHALATION TREATMENT: CPT

## 2023-09-25 PROCEDURE — 2580000003 HC RX 258: Performed by: EMERGENCY MEDICINE

## 2023-09-25 PROCEDURE — 83036 HEMOGLOBIN GLYCOSYLATED A1C: CPT

## 2023-09-25 PROCEDURE — 99285 EMERGENCY DEPT VISIT HI MDM: CPT

## 2023-09-25 PROCEDURE — 96375 TX/PRO/DX INJ NEW DRUG ADDON: CPT

## 2023-09-25 PROCEDURE — 81001 URINALYSIS AUTO W/SCOPE: CPT

## 2023-09-25 PROCEDURE — 74176 CT ABD & PELVIS W/O CONTRAST: CPT

## 2023-09-25 PROCEDURE — 94761 N-INVAS EAR/PLS OXIMETRY MLT: CPT

## 2023-09-25 PROCEDURE — 6360000002 HC RX W HCPCS

## 2023-09-25 PROCEDURE — 36415 COLL VENOUS BLD VENIPUNCTURE: CPT

## 2023-09-25 PROCEDURE — 80048 BASIC METABOLIC PNL TOTAL CA: CPT

## 2023-09-25 PROCEDURE — 2060000000 HC ICU INTERMEDIATE R&B

## 2023-09-25 PROCEDURE — 83690 ASSAY OF LIPASE: CPT

## 2023-09-25 PROCEDURE — 85730 THROMBOPLASTIN TIME PARTIAL: CPT

## 2023-09-25 PROCEDURE — 87635 SARS-COV-2 COVID-19 AMP PRB: CPT

## 2023-09-25 PROCEDURE — 80074 ACUTE HEPATITIS PANEL: CPT

## 2023-09-25 RX ORDER — ALBUTEROL SULFATE 2.5 MG/3ML
2.5 SOLUTION RESPIRATORY (INHALATION)
Status: DISCONTINUED | OUTPATIENT
Start: 2023-09-25 | End: 2023-09-27 | Stop reason: HOSPADM

## 2023-09-25 RX ORDER — MORPHINE SULFATE 4 MG/ML
4 INJECTION, SOLUTION INTRAMUSCULAR; INTRAVENOUS ONCE
Status: COMPLETED | OUTPATIENT
Start: 2023-09-25 | End: 2023-09-25

## 2023-09-25 RX ORDER — INSULIN LISPRO 100 [IU]/ML
0-4 INJECTION, SOLUTION INTRAVENOUS; SUBCUTANEOUS
Status: DISCONTINUED | OUTPATIENT
Start: 2023-09-25 | End: 2023-09-27 | Stop reason: HOSPADM

## 2023-09-25 RX ORDER — POTASSIUM CHLORIDE 20 MEQ/1
40 TABLET, EXTENDED RELEASE ORAL ONCE
Status: COMPLETED | OUTPATIENT
Start: 2023-09-25 | End: 2023-09-25

## 2023-09-25 RX ORDER — ACETAMINOPHEN 325 MG/1
650 TABLET ORAL ONCE
Status: COMPLETED | OUTPATIENT
Start: 2023-09-25 | End: 2023-09-25

## 2023-09-25 RX ORDER — ACETAMINOPHEN 650 MG/1
650 SUPPOSITORY RECTAL EVERY 6 HOURS PRN
Status: DISCONTINUED | OUTPATIENT
Start: 2023-09-25 | End: 2023-09-25

## 2023-09-25 RX ORDER — OXYCODONE HYDROCHLORIDE 5 MG/1
5 TABLET ORAL EVERY 4 HOURS PRN
Status: DISCONTINUED | OUTPATIENT
Start: 2023-09-25 | End: 2023-09-27 | Stop reason: HOSPADM

## 2023-09-25 RX ORDER — POLYETHYLENE GLYCOL 3350 17 G/17G
17 POWDER, FOR SOLUTION ORAL DAILY PRN
Status: DISCONTINUED | OUTPATIENT
Start: 2023-09-25 | End: 2023-09-27 | Stop reason: HOSPADM

## 2023-09-25 RX ORDER — MAGNESIUM SULFATE 1 G/100ML
1000 INJECTION INTRAVENOUS
Status: COMPLETED | OUTPATIENT
Start: 2023-09-25 | End: 2023-09-25

## 2023-09-25 RX ORDER — INSULIN LISPRO 100 [IU]/ML
0-4 INJECTION, SOLUTION INTRAVENOUS; SUBCUTANEOUS EVERY 6 HOURS
Status: DISCONTINUED | OUTPATIENT
Start: 2023-09-25 | End: 2023-09-25

## 2023-09-25 RX ORDER — OXYCODONE HYDROCHLORIDE 5 MG/1
10 TABLET ORAL EVERY 4 HOURS PRN
Status: DISCONTINUED | OUTPATIENT
Start: 2023-09-25 | End: 2023-09-27 | Stop reason: HOSPADM

## 2023-09-25 RX ORDER — SODIUM CHLORIDE 9 MG/ML
INJECTION, SOLUTION INTRAVENOUS CONTINUOUS
Status: DISCONTINUED | OUTPATIENT
Start: 2023-09-25 | End: 2023-09-27 | Stop reason: HOSPADM

## 2023-09-25 RX ORDER — SODIUM CHLORIDE 0.9 % (FLUSH) 0.9 %
5-40 SYRINGE (ML) INJECTION PRN
Status: DISCONTINUED | OUTPATIENT
Start: 2023-09-25 | End: 2023-09-27 | Stop reason: HOSPADM

## 2023-09-25 RX ORDER — ACETAMINOPHEN 325 MG/1
650 TABLET ORAL EVERY 6 HOURS PRN
Status: DISCONTINUED | OUTPATIENT
Start: 2023-09-25 | End: 2023-09-25

## 2023-09-25 RX ORDER — INSULIN GLARGINE 100 [IU]/ML
12 INJECTION, SOLUTION SUBCUTANEOUS NIGHTLY
Status: DISCONTINUED | OUTPATIENT
Start: 2023-09-25 | End: 2023-09-27 | Stop reason: HOSPADM

## 2023-09-25 RX ORDER — SODIUM CHLORIDE 0.9 % (FLUSH) 0.9 %
5-40 SYRINGE (ML) INJECTION EVERY 12 HOURS SCHEDULED
Status: DISCONTINUED | OUTPATIENT
Start: 2023-09-25 | End: 2023-09-27 | Stop reason: HOSPADM

## 2023-09-25 RX ORDER — IPRATROPIUM BROMIDE AND ALBUTEROL SULFATE 2.5; .5 MG/3ML; MG/3ML
1 SOLUTION RESPIRATORY (INHALATION)
Status: DISCONTINUED | OUTPATIENT
Start: 2023-09-25 | End: 2023-09-27

## 2023-09-25 RX ORDER — SODIUM CHLORIDE FOR INHALATION 0.9 %
3 VIAL, NEBULIZER (ML) INHALATION
Status: DISCONTINUED | OUTPATIENT
Start: 2023-09-25 | End: 2023-09-27 | Stop reason: HOSPADM

## 2023-09-25 RX ORDER — DEXTROSE MONOHYDRATE 100 MG/ML
INJECTION, SOLUTION INTRAVENOUS CONTINUOUS PRN
Status: DISCONTINUED | OUTPATIENT
Start: 2023-09-25 | End: 2023-09-27 | Stop reason: HOSPADM

## 2023-09-25 RX ORDER — SODIUM CHLORIDE 9 MG/ML
INJECTION, SOLUTION INTRAVENOUS PRN
Status: DISCONTINUED | OUTPATIENT
Start: 2023-09-25 | End: 2023-09-27 | Stop reason: HOSPADM

## 2023-09-25 RX ORDER — ONDANSETRON 2 MG/ML
4 INJECTION INTRAMUSCULAR; INTRAVENOUS EVERY 6 HOURS PRN
Status: DISCONTINUED | OUTPATIENT
Start: 2023-09-25 | End: 2023-09-27 | Stop reason: HOSPADM

## 2023-09-25 RX ORDER — 0.9 % SODIUM CHLORIDE 0.9 %
500 INTRAVENOUS SOLUTION INTRAVENOUS ONCE
Status: COMPLETED | OUTPATIENT
Start: 2023-09-25 | End: 2023-09-25

## 2023-09-25 RX ORDER — TRAMADOL HYDROCHLORIDE 50 MG/1
50 TABLET ORAL EVERY 6 HOURS PRN
Status: DISCONTINUED | OUTPATIENT
Start: 2023-09-25 | End: 2023-09-27 | Stop reason: HOSPADM

## 2023-09-25 RX ORDER — INSULIN LISPRO 100 [IU]/ML
5 INJECTION, SOLUTION INTRAVENOUS; SUBCUTANEOUS
Status: DISCONTINUED | OUTPATIENT
Start: 2023-09-25 | End: 2023-09-27 | Stop reason: HOSPADM

## 2023-09-25 RX ORDER — ENOXAPARIN SODIUM 100 MG/ML
40 INJECTION SUBCUTANEOUS DAILY
Status: DISCONTINUED | OUTPATIENT
Start: 2023-09-25 | End: 2023-09-27 | Stop reason: HOSPADM

## 2023-09-25 RX ORDER — NICOTINE 21 MG/24HR
1 PATCH, TRANSDERMAL 24 HOURS TRANSDERMAL DAILY
Status: DISCONTINUED | OUTPATIENT
Start: 2023-09-25 | End: 2023-09-27 | Stop reason: HOSPADM

## 2023-09-25 RX ORDER — ONDANSETRON 4 MG/1
4 TABLET, ORALLY DISINTEGRATING ORAL EVERY 8 HOURS PRN
Status: DISCONTINUED | OUTPATIENT
Start: 2023-09-25 | End: 2023-09-27 | Stop reason: HOSPADM

## 2023-09-25 RX ADMIN — IPRATROPIUM BROMIDE AND ALBUTEROL SULFATE 1 DOSE: .5; 2.5 SOLUTION RESPIRATORY (INHALATION) at 11:38

## 2023-09-25 RX ADMIN — ACETAMINOPHEN 650 MG: 325 TABLET ORAL at 01:26

## 2023-09-25 RX ADMIN — TRAMADOL HYDROCHLORIDE 50 MG: 50 TABLET ORAL at 17:09

## 2023-09-25 RX ADMIN — SODIUM CHLORIDE: 9 INJECTION, SOLUTION INTRAVENOUS at 14:15

## 2023-09-25 RX ADMIN — MAGNESIUM SULFATE HEPTAHYDRATE 1000 MG: 1 INJECTION, SOLUTION INTRAVENOUS at 10:26

## 2023-09-25 RX ADMIN — INSULIN LISPRO 5 UNITS: 100 INJECTION, SOLUTION INTRAVENOUS; SUBCUTANEOUS at 10:27

## 2023-09-25 RX ADMIN — CEFTRIAXONE 1000 MG: 1 INJECTION, POWDER, FOR SOLUTION INTRAMUSCULAR; INTRAVENOUS at 04:51

## 2023-09-25 RX ADMIN — INSULIN GLARGINE 12 UNITS: 100 INJECTION, SOLUTION SUBCUTANEOUS at 22:03

## 2023-09-25 RX ADMIN — SODIUM CHLORIDE: 9 INJECTION, SOLUTION INTRAVENOUS at 04:22

## 2023-09-25 RX ADMIN — IPRATROPIUM BROMIDE AND ALBUTEROL SULFATE 1 DOSE: .5; 2.5 SOLUTION RESPIRATORY (INHALATION) at 15:09

## 2023-09-25 RX ADMIN — ENOXAPARIN SODIUM 40 MG: 100 INJECTION SUBCUTANEOUS at 10:26

## 2023-09-25 RX ADMIN — OXYCODONE HYDROCHLORIDE 10 MG: 5 TABLET ORAL at 22:27

## 2023-09-25 RX ADMIN — INSULIN LISPRO 1 UNITS: 100 INJECTION, SOLUTION INTRAVENOUS; SUBCUTANEOUS at 22:03

## 2023-09-25 RX ADMIN — SODIUM CHLORIDE 500 ML: 9 INJECTION, SOLUTION INTRAVENOUS at 01:22

## 2023-09-25 RX ADMIN — SODIUM CHLORIDE: 9 INJECTION, SOLUTION INTRAVENOUS at 22:16

## 2023-09-25 RX ADMIN — INSULIN LISPRO 2 UNITS: 100 INJECTION, SOLUTION INTRAVENOUS; SUBCUTANEOUS at 04:46

## 2023-09-25 RX ADMIN — SODIUM CHLORIDE 3000 MG: 900 INJECTION INTRAVENOUS at 03:01

## 2023-09-25 RX ADMIN — INSULIN HUMAN 12 UNITS: 100 INJECTION, SUSPENSION SUBCUTANEOUS at 10:26

## 2023-09-25 RX ADMIN — MORPHINE SULFATE 4 MG: 4 INJECTION, SOLUTION INTRAMUSCULAR; INTRAVENOUS at 01:26

## 2023-09-25 RX ADMIN — MAGNESIUM SULFATE HEPTAHYDRATE 1000 MG: 1 INJECTION, SOLUTION INTRAVENOUS at 11:46

## 2023-09-25 RX ADMIN — IPRATROPIUM BROMIDE AND ALBUTEROL SULFATE 1 DOSE: .5; 2.5 SOLUTION RESPIRATORY (INHALATION) at 19:55

## 2023-09-25 RX ADMIN — POTASSIUM CHLORIDE 40 MEQ: 1500 TABLET, EXTENDED RELEASE ORAL at 10:33

## 2023-09-25 RX ADMIN — IPRATROPIUM BROMIDE AND ALBUTEROL SULFATE 1 DOSE: .5; 2.5 SOLUTION RESPIRATORY (INHALATION) at 08:02

## 2023-09-25 ASSESSMENT — PAIN - FUNCTIONAL ASSESSMENT
PAIN_FUNCTIONAL_ASSESSMENT: ACTIVITIES ARE NOT PREVENTED
PAIN_FUNCTIONAL_ASSESSMENT: 0-10
PAIN_FUNCTIONAL_ASSESSMENT: ACTIVITIES ARE NOT PREVENTED

## 2023-09-25 ASSESSMENT — LIFESTYLE VARIABLES
HOW MANY STANDARD DRINKS CONTAINING ALCOHOL DO YOU HAVE ON A TYPICAL DAY: PATIENT DOES NOT DRINK
HOW OFTEN DO YOU HAVE A DRINK CONTAINING ALCOHOL: NEVER

## 2023-09-25 ASSESSMENT — PAIN SCALES - GENERAL
PAINLEVEL_OUTOF10: 9
PAINLEVEL_OUTOF10: 9
PAINLEVEL_OUTOF10: 10
PAINLEVEL_OUTOF10: 9

## 2023-09-25 ASSESSMENT — PAIN DESCRIPTION - LOCATION
LOCATION: HEAD
LOCATION: HEAD

## 2023-09-25 ASSESSMENT — PAIN DESCRIPTION - DESCRIPTORS
DESCRIPTORS: ACHING
DESCRIPTORS: ACHING

## 2023-09-25 ASSESSMENT — PAIN DESCRIPTION - PAIN TYPE
TYPE: CHRONIC PAIN
TYPE: CHRONIC PAIN

## 2023-09-25 NOTE — H&P
Chronic back pain     COPD (chronic obstructive pulmonary disease) (HCC)     DDD (degenerative disc disease)     Diabetes mellitus (HCC)     Hyperlipidemia     Left knee pain     Lumbago     Lumbar disc displacement without myelopathy     bwc    Thoracic or lumbosacral neuritis or radiculitis, unspecified     bwc           Past Surgical History:   Procedure Laterality Date    TOTAL KNEE ARTHROPLASTY Left 2009    VASECTOMY         Medications Prior to Admission:    Prior to Visit Medications    Medication Sig Taking? Authorizing Provider   pantoprazole (PROTONIX) 40 MG tablet 1 tablet  Patient not taking: Reported on 9/25/2023  Historical Provider, MD   oxyCODONE-acetaminophen (PERCOCET) 5-325 MG per tablet 1 tablet. Patient not taking: Reported on 9/25/2023  Historical Provider, MD   DROPLET PEN NEEDLES 32G X 4 MM MISC   Historical Provider, MD   NOVOLIN 70/30 FLEXPEN (70-30) 100 UNIT/ML injection pen   Historical Provider, MD   Insulin Glargine, 2 Unit Dial, (TOUJEO MAX SOLOSTAR) 300 UNIT/ML SOPN Inject 12 Units into the skin at bedtime  Historical Provider, MD   Continuous Blood Gluc  (FREESTYLE NITESH 2 READER) [facility] Daily. DX: E11.9  YASIR Guy CNP   Continuous Blood Gluc Sensor (FREESTYLE NITESH 2 SENSOR) [facility] Daily.  DX: E11.9  YASIR Guy CNP   Vancomycin HCl 10 g SOLR every 8 hours  Patient not taking: Reported on 9/25/2023  Historical Provider, MD   metFORMIN (GLUCOPHAGE) 1000 MG tablet Take 1 tablet by mouth 2 times daily (with meals)  Patient not taking: Reported on 9/25/2023  YASIR Guy CNP   albuterol sulfate HFA (VENTOLIN HFA) 108 (90 Base) MCG/ACT inhaler Inhale 2 puffs into the lungs 4 times daily as needed for Wheezing  Patient not taking: Reported on 10/10/2022  Arleen Severe, MD   clonazePAM (KLONOPIN) 0.5 MG tablet Take 1 tablet by mouth 2 times daily as needed  Patient not taking: Reported on 10/10/2022  Dina Garner MD   nicotine (Valeriy Skipper

## 2023-09-25 NOTE — FLOWSHEET NOTE
rounding in PCU. Assessment: Rachelle was sleeping. Plan: Chaplains are available on site or on call 24/7 for spiritual and emotional support.     Electronically signed by Patrick Moreno on 9/25/2023 at 4:11 PM

## 2023-09-25 NOTE — CARE COORDINATION
DISCHARGE BARRIERS       Reason for Referral:  SW completed a Psychosocial Assessment for evaluation of patient's mental health, social status, and functional capacity within the community. Gilberto Jimenez is a 79 y.o. male admitted due to UTI (urinary tract infection). Patient alone. SW provided supportive listening while patient discussed past medical history and events leading up to hospitalization. Mental Status:  Alert, oriented, and engaging during assessment. Decision Making:  Makes own decisions. Family/Social/Home Environment: lives with their daughter    Support: Discussed a good social support network     Current Services:  None    Current DMEs: cane as needed    PCP: YASIR Negron CNP and repots no issues affording medication.  status:   None     ADLs and means of transportation: Independent in ADLs prior to hospitalization and able to transport self. Food insecurity or needed financial assistance: Denies any food insecurity or financial concerns at this time. ACP and Code Status:  SW discussed an Advance Directive which included the patient's choices for care and treatment in the case of a health event that adversely affects decision-making abilities. SW provided education and resources. Gilberto Jimenez has no questions at this time and has agreed to keep me up-to-date should anything change. Gilberto Jimenez is a Full Code status and has NO advanced directive - not interested in additional information. Collaborative List of SNF/ECF/HH were provided: offered, declined No discharge order at this time. Anticipated Needs/Discharge Plan:  Spoke with patient/family/representative about discharge plan. Patient/Family/Representative verbalizes understanding of the plan of care and denies discharge needs or further services at this time. SW provided business card. SW will continue to monitor needs and assist as appropriate.          Electronically signed
discharge to: [address] [facility] for transportation at discharge:      Financial    Payor: Dariela Vazquez / Plan: MEDICARE PART A AND B / Product Type: *No Product type* /     Does insurance require precert for SNF: No    Potential assistance Purchasing Medications:    Meds-to-Beds request:        [id number] - [place], [address]  [address]  [place] [address]  Phone: [phone] [facility]: [id number] [place], [facility],[address] [facility] [phone] - F [phone]  [address] [facility] [address]  Phone: [phone] Fax: [phone]      Notes:    Factors facilitating achievement of predicted outcomes: Family support and Cooperative    Barriers to discharge: Decreased endurance    Additional Case Management Notes: Patient lives at home and is independent. Pt denies any discharge needs at present time. The Plan for Transition of Care is related to the following treatment goals of UTI (urinary tract infection) [N39.0]  Urinary tract infection without hematuria, site unspecified [U82.9]    IF APPLICABLE: The Patient and/or patient representative Roberto Carlos Pressley and his family were provided with a choice of provider and agrees with the discharge plan. Freedom of choice list with basic dialogue that supports the patient's individualized plan of care/goals and shares the quality data associated with the providers was provided to:     Patient Representative Name:       The Patient and/or Patient Representative Agree with the Discharge Plan?       Mary Peralta RN  Case Management Department

## 2023-09-25 NOTE — ED PROVIDER NOTES
Tract Infection     Order Specific Question:   UTI duration of therapy     Answer:   5 days           Re-evaluation Notes    Laboratory results revealed an elevated white blood cell count elevated liver enzymes and elevated lipase CAT scan per radiologist concerning for hepatitis possible neoplastic lesion urine shows signs of infection at this time patient started antibiotics I did discuss case with nurse practitioner on-call Jessica Bryson is agreeable to admit    CRITICAL CARE:   IP CONSULT TO CASE MANAGEMENT  IP CONSULT TO SOCIAL WORK  IP CONSULT TO DIABETES EDUCATOR      CONSULTS:      PROCEDURES:  None    FINAL IMPRESSION      1. Urinary tract infection without hematuria, site unspecified          DISPOSITION/PLAN   DISPOSITION Admitted 09/25/2023 03:53:52 AM      Condition on Disposition    Stable    PATIENT REFERRED TO:  No follow-up provider specified. DISCHARGE MEDICATIONS:  Current Discharge Medication List          (Please note that portions of this note were completed with a voice recognition program.  Efforts were made to edit the dictations but occasionally words are mis-transcribed.)    Catalino Mann MD,, MD, F.A.C.E.P.   Attending Emergency Physician        Catalino Mann MD  09/25/23 [id number]

## 2023-09-25 NOTE — PLAN OF CARE
Problem: Discharge Planning  Goal: Discharge to home or other facility with appropriate resources  9/25/2023 1517 by Imani Crowder RN  Outcome: Progressing  Flowsheets (Taken 9/25/2023 1030)  Discharge to home or other facility with appropriate resources:   Identify barriers to discharge with patient and caregiver   Arrange for needed discharge resources and transportation as appropriate   Identify discharge learning needs (meds, wound care, etc)   Refer to discharge planning if patient needs post-hospital services based on physician order or complex needs related to functional status, cognitive ability or social support system  Note: Plan for discharge back to home when appropriate  9/25/2023 0637 by Bennett Hairston RN  Outcome: Progressing     Problem: Pain  Goal: Verbalizes/displays adequate comfort level or baseline comfort level  9/25/2023 1517 by Imani Crowder RN  Outcome: Progressing  Flowsheets (Taken 9/25/2023 1517)  Verbalizes/displays adequate comfort level or baseline comfort level:   Encourage patient to monitor pain and request assistance   Assess pain using appropriate pain scale   Administer analgesics based on type and severity of pain and evaluate response   Implement non-pharmacological measures as appropriate and evaluate response  Note: No c/o pain at this time  9/25/2023 8628 by Bennett Hairston RN  Outcome: Progressing     Problem: Chronic Conditions and Co-morbidities  Goal: Patient's chronic conditions and co-morbidity symptoms are monitored and maintained or improved  Outcome: Progressing  Flowsheets (Taken 9/25/2023 1030)  Care Plan - Patient's Chronic Conditions and Co-Morbidity Symptoms are Monitored and Maintained or Improved:   Monitor and assess patient's chronic conditions and comorbid symptoms for stability, deterioration, or improvement   Collaborate with multidisciplinary team to address chronic and comorbid conditions and prevent exacerbation or deterioration   Update

## 2023-09-26 PROBLEM — A41.9 SEPSIS (HCC): Status: RESOLVED | Noted: 2022-10-03 | Resolved: 2023-09-26

## 2023-09-26 LAB
ALBUMIN SERPL-MCNC: 3.3 G/DL (ref 3.5–5.2)
ALBUMIN/GLOB SERPL: 1.1 {RATIO} (ref 1–2.5)
ALP SERPL-CCNC: 365 U/L (ref 40–129)
ALT SERPL-CCNC: 225 U/L (ref 5–41)
ANION GAP SERPL CALCULATED.3IONS-SCNC: 8 MMOL/L (ref 9–17)
AST SERPL-CCNC: 92 U/L
BASOPHILS # BLD: 0 K/UL (ref 0–0.2)
BASOPHILS NFR BLD: 0 % (ref 0–2)
BILIRUB DIRECT SERPL-MCNC: 4.7 MG/DL
BILIRUB INDIRECT SERPL-MCNC: 0.8 MG/DL (ref 0–1)
BILIRUB SERPL-MCNC: 5.5 MG/DL (ref 0.3–1.2)
BUN SERPL-MCNC: 8 MG/DL (ref 8–23)
CALCIUM SERPL-MCNC: 8.8 MG/DL (ref 8.6–10.4)
CHLORIDE SERPL-SCNC: 102 MMOL/L (ref 98–107)
CO2 SERPL-SCNC: 29 MMOL/L (ref 20–31)
CREAT SERPL-MCNC: 0.9 MG/DL (ref 0.7–1.2)
EOSINOPHIL # BLD: 0.12 K/UL (ref 0–0.44)
EOSINOPHILS RELATIVE PERCENT: 1 % (ref 1–4)
ERYTHROCYTE [DISTWIDTH] IN BLOOD BY AUTOMATED COUNT: 12.8 % (ref 11.8–14.4)
GFR SERPL CREATININE-BSD FRML MDRD: >60 ML/MIN/1.73M2
GLUCOSE BLD-MCNC: 135 MG/DL (ref 75–110)
GLUCOSE BLD-MCNC: 143 MG/DL (ref 75–110)
GLUCOSE BLD-MCNC: 217 MG/DL (ref 75–110)
GLUCOSE SERPL-MCNC: 134 MG/DL (ref 70–99)
HCT VFR BLD AUTO: 45.7 % (ref 40.7–50.3)
HGB BLD-MCNC: 14.8 G/DL (ref 13–17)
IMM GRANULOCYTES # BLD AUTO: 0.12 K/UL (ref 0–0.3)
IMM GRANULOCYTES NFR BLD: 1 %
LIPASE SERPL-CCNC: 112 U/L (ref 13–60)
LYMPHOCYTES NFR BLD: 1.31 K/UL (ref 1.1–3.7)
LYMPHOCYTES RELATIVE PERCENT: 11 % (ref 24–43)
MCH RBC QN AUTO: 29.2 PG (ref 25.2–33.5)
MCHC RBC AUTO-ENTMCNC: 32.4 G/DL (ref 25.2–33.5)
MCV RBC AUTO: 90.1 FL (ref 82.6–102.9)
MONOCYTES NFR BLD: 1.55 K/UL (ref 0.1–1.2)
MONOCYTES NFR BLD: 13 % (ref 3–12)
MORPHOLOGY: ABNORMAL
MORPHOLOGY: ABNORMAL
NEUTROPHILS NFR BLD: 74 % (ref 36–65)
NEUTS SEG NFR BLD: 8.8 K/UL (ref 1.5–8.1)
NRBC BLD-RTO: 0 PER 100 WBC
PLATELET # BLD AUTO: 216 K/UL (ref 138–453)
PMV BLD AUTO: 10.3 FL (ref 8.1–13.5)
POTASSIUM SERPL-SCNC: 4.3 MMOL/L (ref 3.7–5.3)
PROT SERPL-MCNC: 6.4 G/DL (ref 6.4–8.3)
RBC # BLD AUTO: 5.07 M/UL (ref 4.21–5.77)
SODIUM SERPL-SCNC: 139 MMOL/L (ref 135–144)
WBC OTHER # BLD: 11.9 K/UL (ref 3.5–11.3)

## 2023-09-26 PROCEDURE — 85025 COMPLETE CBC W/AUTO DIFF WBC: CPT

## 2023-09-26 PROCEDURE — 2580000003 HC RX 258

## 2023-09-26 PROCEDURE — 94640 AIRWAY INHALATION TREATMENT: CPT

## 2023-09-26 PROCEDURE — 6370000000 HC RX 637 (ALT 250 FOR IP): Performed by: INTERNAL MEDICINE

## 2023-09-26 PROCEDURE — 99231 SBSQ HOSP IP/OBS SF/LOW 25: CPT | Performed by: INTERNAL MEDICINE

## 2023-09-26 PROCEDURE — 2700000000 HC OXYGEN THERAPY PER DAY

## 2023-09-26 PROCEDURE — 6370000000 HC RX 637 (ALT 250 FOR IP)

## 2023-09-26 PROCEDURE — 80053 COMPREHEN METABOLIC PANEL: CPT

## 2023-09-26 PROCEDURE — 82248 BILIRUBIN DIRECT: CPT

## 2023-09-26 PROCEDURE — 36415 COLL VENOUS BLD VENIPUNCTURE: CPT

## 2023-09-26 PROCEDURE — 82947 ASSAY GLUCOSE BLOOD QUANT: CPT

## 2023-09-26 PROCEDURE — 94761 N-INVAS EAR/PLS OXIMETRY MLT: CPT

## 2023-09-26 PROCEDURE — 2060000000 HC ICU INTERMEDIATE R&B

## 2023-09-26 PROCEDURE — 6360000002 HC RX W HCPCS: Performed by: INTERNAL MEDICINE

## 2023-09-26 PROCEDURE — 83690 ASSAY OF LIPASE: CPT

## 2023-09-26 PROCEDURE — 6360000002 HC RX W HCPCS

## 2023-09-26 RX ORDER — FLUCONAZOLE 2 MG/ML
200 INJECTION, SOLUTION INTRAVENOUS EVERY 24 HOURS
Status: DISCONTINUED | OUTPATIENT
Start: 2023-09-26 | End: 2023-09-27 | Stop reason: HOSPADM

## 2023-09-26 RX ADMIN — OXYCODONE HYDROCHLORIDE 10 MG: 5 TABLET ORAL at 11:00

## 2023-09-26 RX ADMIN — ENOXAPARIN SODIUM 40 MG: 100 INJECTION SUBCUTANEOUS at 09:38

## 2023-09-26 RX ADMIN — IPRATROPIUM BROMIDE AND ALBUTEROL SULFATE 1 DOSE: .5; 2.5 SOLUTION RESPIRATORY (INHALATION) at 20:00

## 2023-09-26 RX ADMIN — INSULIN GLARGINE 12 UNITS: 100 INJECTION, SOLUTION SUBCUTANEOUS at 20:52

## 2023-09-26 RX ADMIN — IPRATROPIUM BROMIDE AND ALBUTEROL SULFATE 1 DOSE: .5; 2.5 SOLUTION RESPIRATORY (INHALATION) at 00:05

## 2023-09-26 RX ADMIN — INSULIN LISPRO 1 UNITS: 100 INJECTION, SOLUTION INTRAVENOUS; SUBCUTANEOUS at 20:52

## 2023-09-26 RX ADMIN — IPRATROPIUM BROMIDE AND ALBUTEROL SULFATE 1 DOSE: .5; 2.5 SOLUTION RESPIRATORY (INHALATION) at 08:37

## 2023-09-26 RX ADMIN — CEFTRIAXONE 1000 MG: 1 INJECTION, POWDER, FOR SOLUTION INTRAMUSCULAR; INTRAVENOUS at 05:19

## 2023-09-26 RX ADMIN — INSULIN LISPRO 5 UNITS: 100 INJECTION, SOLUTION INTRAVENOUS; SUBCUTANEOUS at 09:37

## 2023-09-26 RX ADMIN — IPRATROPIUM BROMIDE AND ALBUTEROL SULFATE 1 DOSE: .5; 2.5 SOLUTION RESPIRATORY (INHALATION) at 12:57

## 2023-09-26 RX ADMIN — SODIUM CHLORIDE: 9 INJECTION, SOLUTION INTRAVENOUS at 06:56

## 2023-09-26 RX ADMIN — SODIUM CHLORIDE: 9 INJECTION, SOLUTION INTRAVENOUS at 14:34

## 2023-09-26 RX ADMIN — FLUCONAZOLE 200 MG: 2 INJECTION, SOLUTION INTRAVENOUS at 17:10

## 2023-09-26 RX ADMIN — IPRATROPIUM BROMIDE AND ALBUTEROL SULFATE 1 DOSE: .5; 2.5 SOLUTION RESPIRATORY (INHALATION) at 16:59

## 2023-09-26 RX ADMIN — SODIUM CHLORIDE: 9 INJECTION, SOLUTION INTRAVENOUS at 23:13

## 2023-09-26 ASSESSMENT — PAIN - FUNCTIONAL ASSESSMENT: PAIN_FUNCTIONAL_ASSESSMENT: ACTIVITIES ARE NOT PREVENTED

## 2023-09-26 ASSESSMENT — PAIN SCALES - GENERAL
PAINLEVEL_OUTOF10: 9
PAINLEVEL_OUTOF10: 9

## 2023-09-26 ASSESSMENT — PAIN DESCRIPTION - LOCATION
LOCATION: HEAD
LOCATION: HEAD

## 2023-09-26 ASSESSMENT — PAIN DESCRIPTION - DESCRIPTORS
DESCRIPTORS: ACHING
DESCRIPTORS: ACHING

## 2023-09-26 NOTE — PLAN OF CARE
Problem: Discharge Planning  Goal: Discharge to home or other facility with appropriate resources  Outcome: Progressing  Flowsheets (Taken 9/26/2023 0855)  Discharge to home or other facility with appropriate resources:   Identify barriers to discharge with patient and caregiver   Arrange for needed discharge resources and transportation as appropriate   Identify discharge learning needs (meds, wound care, etc)   Refer to discharge planning if patient needs post-hospital services based on physician order or complex needs related to functional status, cognitive ability or social support system  Note: Plan for discharge back to home when appropriate     Problem: Pain  Goal: Verbalizes/displays adequate comfort level or baseline comfort level  Outcome: Progressing  Flowsheets (Taken 9/25/2023 1510)  Verbalizes/displays adequate comfort level or baseline comfort level:   Encourage patient to monitor pain and request assistance   Assess pain using appropriate pain scale   Administer analgesics based on type and severity of pain and evaluate response   Implement non-pharmacological measures as appropriate and evaluate response  Note: PRN pain meds given     Problem: Chronic Conditions and Co-morbidities  Goal: Patient's chronic conditions and co-morbidity symptoms are monitored and maintained or improved  Outcome: Progressing  Flowsheets (Taken 9/26/2023 0855)  Care Plan - Patient's Chronic Conditions and Co-Morbidity Symptoms are Monitored and Maintained or Improved:   Monitor and assess patient's chronic conditions and comorbid symptoms for stability, deterioration, or improvement   Collaborate with multidisciplinary team to address chronic and comorbid conditions and prevent exacerbation or deterioration   Update acute care plan with appropriate goals if chronic or comorbid symptoms are exacerbated and prevent overall improvement and discharge     Problem: Skin/Tissue Integrity  Goal: Absence of new skin
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
## 2023-09-27 ENCOUNTER — APPOINTMENT (OUTPATIENT)
Dept: INTERVENTIONAL RADIOLOGY/VASCULAR | Age: 68
DRG: 728 | End: 2023-09-27
Attending: INTERNAL MEDICINE
Payer: MEDICARE

## 2023-09-27 VITALS
WEIGHT: 155 LBS | HEART RATE: 57 BPM | TEMPERATURE: 97.4 F | BODY MASS INDEX: 23.49 KG/M2 | RESPIRATION RATE: 16 BRPM | DIASTOLIC BLOOD PRESSURE: 75 MMHG | OXYGEN SATURATION: 90 % | HEIGHT: 68 IN | SYSTOLIC BLOOD PRESSURE: 120 MMHG

## 2023-09-27 LAB
ALBUMIN SERPL-MCNC: 2.9 G/DL (ref 3.5–5.2)
ALBUMIN/GLOB SERPL: 1.1 {RATIO} (ref 1–2.5)
ALP SERPL-CCNC: 305 U/L (ref 40–129)
ALT SERPL-CCNC: 134 U/L (ref 5–41)
ANION GAP SERPL CALCULATED.3IONS-SCNC: 7 MMOL/L (ref 9–17)
AST SERPL-CCNC: 42 U/L
BASOPHILS # BLD: <0.03 K/UL (ref 0–0.2)
BASOPHILS NFR BLD: 0 % (ref 0–2)
BILIRUB SERPL-MCNC: 3.6 MG/DL (ref 0.3–1.2)
BUN SERPL-MCNC: 7 MG/DL (ref 8–23)
BUN/CREAT SERPL: 10 (ref 9–20)
CALCIUM SERPL-MCNC: 8.4 MG/DL (ref 8.6–10.4)
CHLORIDE SERPL-SCNC: 106 MMOL/L (ref 98–107)
CO2 SERPL-SCNC: 25 MMOL/L (ref 20–31)
CREAT SERPL-MCNC: 0.7 MG/DL (ref 0.7–1.2)
EOSINOPHIL # BLD: 0.23 K/UL (ref 0–0.44)
EOSINOPHILS RELATIVE PERCENT: 3 % (ref 1–4)
ERYTHROCYTE [DISTWIDTH] IN BLOOD BY AUTOMATED COUNT: 12.8 % (ref 11.8–14.4)
GFR SERPL CREATININE-BSD FRML MDRD: >60 ML/MIN/1.73M2
GLUCOSE BLD-MCNC: 76 MG/DL (ref 75–110)
GLUCOSE SERPL-MCNC: 90 MG/DL (ref 70–99)
HCT VFR BLD AUTO: 39.7 % (ref 40.7–50.3)
HGB BLD-MCNC: 12.9 G/DL (ref 13–17)
IMM GRANULOCYTES # BLD AUTO: 0.05 K/UL (ref 0–0.3)
IMM GRANULOCYTES NFR BLD: 1 %
LIPASE SERPL-CCNC: 45 U/L (ref 13–60)
LYMPHOCYTES NFR BLD: 1.5 K/UL (ref 1.1–3.7)
LYMPHOCYTES RELATIVE PERCENT: 19 % (ref 24–43)
MCH RBC QN AUTO: 29.2 PG (ref 25.2–33.5)
MCHC RBC AUTO-ENTMCNC: 32.5 G/DL (ref 25.2–33.5)
MCV RBC AUTO: 89.8 FL (ref 82.6–102.9)
MICROORGANISM SPEC CULT: ABNORMAL
MONOCYTES NFR BLD: 1.1 K/UL (ref 0.1–1.2)
MONOCYTES NFR BLD: 14 % (ref 3–12)
NEUTROPHILS NFR BLD: 63 % (ref 36–65)
NEUTS SEG NFR BLD: 5.09 K/UL (ref 1.5–8.1)
NRBC BLD-RTO: 0 PER 100 WBC
PLATELET # BLD AUTO: 195 K/UL (ref 138–453)
PMV BLD AUTO: 10.2 FL (ref 8.1–13.5)
POTASSIUM SERPL-SCNC: 3.5 MMOL/L (ref 3.7–5.3)
PROT SERPL-MCNC: 5.6 G/DL (ref 6.4–8.3)
RBC # BLD AUTO: 4.42 M/UL (ref 4.21–5.77)
SODIUM SERPL-SCNC: 138 MMOL/L (ref 135–144)
SPECIMEN DESCRIPTION: ABNORMAL
WBC OTHER # BLD: 8 K/UL (ref 3.5–11.3)

## 2023-09-27 PROCEDURE — 85025 COMPLETE CBC W/AUTO DIFF WBC: CPT

## 2023-09-27 PROCEDURE — 83690 ASSAY OF LIPASE: CPT

## 2023-09-27 PROCEDURE — 6370000000 HC RX 637 (ALT 250 FOR IP): Performed by: NURSE PRACTITIONER

## 2023-09-27 PROCEDURE — 82947 ASSAY GLUCOSE BLOOD QUANT: CPT

## 2023-09-27 PROCEDURE — 2580000003 HC RX 258

## 2023-09-27 PROCEDURE — 76705 ECHO EXAM OF ABDOMEN: CPT

## 2023-09-27 PROCEDURE — 36415 COLL VENOUS BLD VENIPUNCTURE: CPT

## 2023-09-27 PROCEDURE — 80053 COMPREHEN METABOLIC PANEL: CPT

## 2023-09-27 PROCEDURE — 6360000002 HC RX W HCPCS

## 2023-09-27 PROCEDURE — 99238 HOSP IP/OBS DSCHRG MGMT 30/<: CPT | Performed by: INTERNAL MEDICINE

## 2023-09-27 RX ORDER — NALOXONE HYDROCHLORIDE 2 MG/.4ML
2 INJECTION, SOLUTION INTRAMUSCULAR; SUBCUTANEOUS
Qty: 0.4 ML | Refills: 0 | Status: SHIPPED | OUTPATIENT
Start: 2023-09-27 | End: 2023-09-27

## 2023-09-27 RX ORDER — POTASSIUM CHLORIDE 20 MEQ/1
40 TABLET, EXTENDED RELEASE ORAL ONCE
Status: COMPLETED | OUTPATIENT
Start: 2023-09-27 | End: 2023-09-27

## 2023-09-27 RX ORDER — TRAMADOL HYDROCHLORIDE 50 MG/1
50 TABLET ORAL EVERY 6 HOURS PRN
Qty: 12 TABLET | Refills: 0 | Status: SHIPPED | OUTPATIENT
Start: 2023-09-27 | End: 2023-09-30

## 2023-09-27 RX ORDER — CHOLECALCIFEROL (VITAMIN D3) 125 MCG
1 CAPSULE ORAL 3 TIMES DAILY
Qty: 30 CAPSULE | Refills: 0 | COMMUNITY
Start: 2023-09-27 | End: 2023-10-07

## 2023-09-27 RX ORDER — FLUCONAZOLE 100 MG/1
200 TABLET ORAL DAILY
Qty: 6 TABLET | Refills: 0 | Status: SHIPPED | OUTPATIENT
Start: 2023-09-27 | End: 2023-10-03

## 2023-09-27 RX ADMIN — SODIUM CHLORIDE: 9 INJECTION, SOLUTION INTRAVENOUS at 06:44

## 2023-09-27 RX ADMIN — CEFTRIAXONE 1000 MG: 1 INJECTION, POWDER, FOR SOLUTION INTRAMUSCULAR; INTRAVENOUS at 04:49

## 2023-09-27 RX ADMIN — POTASSIUM CHLORIDE 40 MEQ: 1500 TABLET, EXTENDED RELEASE ORAL at 06:39

## 2023-09-27 NOTE — DISCHARGE INSTR - DIET

## 2023-09-27 NOTE — DISCHARGE INSTRUCTIONS
Follow up with Crystal Salguero CNP in 1 week    Follow up with Gastroenterology in 1-2 weeks    CMP and CBC Monday 10/2/23

## 2023-09-28 ENCOUNTER — FOLLOWUP TELEPHONE ENCOUNTER (OUTPATIENT)
Dept: INPATIENT UNIT | Age: 68
End: 2023-09-28

## 2023-09-29 NOTE — DISCHARGE SUMMARY
[address]                 [address] [facility], [address]                               DISCHARGE SUMMARY    PATIENT NAME: Alexa Mullen                    :        1955  MED REC NO:   [id number]                             ROOM:       [id number]  ACCOUNT NO:   [de-identified]                           ADMIT DATE: 2023  PROVIDER:     Radha Rojas. Sin Garcia MD                  DISCHARGE DATE:  2023    PERSONAL PHYSICIAN:  Humaira Kumar, Internal Medicine, [facility]. The patient variously seen [facility], [facility]  Consultants at [facility]. DIAGNOSES:  1. Urinary tract infection, 2023, fever, yeast present. Initially treated with Rocephin, discontinued, now Diflucan. 2.  Chronic abdominal pain, 2023. Elevated LFTs. Doppler  ultrasound right upper quadrant gallbladder, 2023; cholelithiasis  with questionable intrahepatic biliary ductal dilatation, right renal  cyst, hepatitis C antibody positive, 2023. CT AP, 2023,  asymmetric bladder wall thickening, moderate left hydronephrosis,  nonspecific periportal edema, consider bladder malignancy. Needs to be  followed up in the outpatient setting. 3.  Hypokalemia, corrected. 4.  Noncompliance with medical regimen. 5.  Coronary artery disease. 6.  COPD, multiple exacerbations, underlying asthma. 7.  Diabetes mellitus type 2, uncontrolled, noncompliance with regimen. 8.  Chronic pain syndrome. 9.  Tobacco abuse one pack a day 47 years. 10.  Prior history of substance abuse, methamphetamine, IVDA, heroin,  cocaine. 11.  Marijuana current use. 12.  Alcohol abuse history. 13.  Depression. Other medical problems set forth in the progress note of 2023  incorporated for reference herein.     HISTORY OF PRESENT ILLNESS AND HOSPITAL COURSE:  This is a 80-year-old  white male, patient of Humaira Kumar, Internal Medicine,

## 2023-10-02 ENCOUNTER — TELEPHONE (OUTPATIENT)
Dept: INTERNAL MEDICINE | Age: 68
End: 2023-10-02

## 2023-10-02 NOTE — TELEPHONE ENCOUNTER
Spoke with pt- appt scheduled for 10/17/23. Care Transitions Initial Follow Up Call    Outreach made within 2 business days of discharge: No    Patient: Sebastian Mitchell Patient : 1955   MRN: [id number]  Reason for Admission: There are no discharge diagnoses documented for the most recent discharge. Discharge Date: 23       Spoke with: patient    Discharge department/facility: [facility]    TCM Interactive Patient Contact:  Was patient able to fill all prescriptions: Yes  Was patient instructed to bring all medications to the follow-up visit: Yes  Is patient taking all medications as directed in the discharge summary?  Yes  Does patient understand their discharge instructions: Yes  Does patient have questions or concerns that need addressed prior to 7-14 day follow up office visit: no    Scheduled appointment with PCP within 7-14 days    Follow Up  Future Appointments   Date Time Provider [address] [place] [address]   10/17/2023  3:30 PM YASIR Corona - CNP [facility]       Sabino Crouch LPN

## 2023-10-02 NOTE — TELEPHONE ENCOUNTER
Discharged from [facility] 9/27/23- UTI (notification sent through [facility]). Unable to initiate Transitional Care Call; past 2 days. Left message with pt to call office back to schedule a follow up appt.

## 2023-10-25 PROBLEM — N39.0 UTI (URINARY TRACT INFECTION): Status: RESOLVED | Noted: 2023-09-25 | Resolved: 2023-10-25

## 2023-11-22 ENCOUNTER — OFFICE VISIT (OUTPATIENT)
Dept: PRIMARY CARE CLINIC | Age: 68
End: 2023-11-22

## 2023-11-22 ENCOUNTER — HOSPITAL ENCOUNTER (OUTPATIENT)
Age: 68
Setting detail: SPECIMEN
Discharge: HOME | End: 2023-11-22
Payer: MEDICARE

## 2023-11-22 ENCOUNTER — HOSPITAL ENCOUNTER (EMERGENCY)
Age: 68
Discharge: HOME | End: 2023-11-22
Attending: EMERGENCY MEDICINE
Payer: MEDICARE

## 2023-11-22 VITALS
BODY MASS INDEX: 23.54 KG/M2 | DIASTOLIC BLOOD PRESSURE: 98 MMHG | SYSTOLIC BLOOD PRESSURE: 154 MMHG | HEIGHT: 67 IN | OXYGEN SATURATION: 92 % | WEIGHT: 150 LBS | RESPIRATION RATE: 20 BRPM | TEMPERATURE: 97.2 F | HEART RATE: 69 BPM

## 2023-11-22 DIAGNOSIS — B37.49 YEAST UTI: ICD-10-CM

## 2023-11-22 DIAGNOSIS — R33.9 ACUTE ON CHRONIC URINARY RETENTION: Primary | ICD-10-CM

## 2023-11-22 DIAGNOSIS — R30.0 DYSURIA: ICD-10-CM

## 2023-11-22 DIAGNOSIS — R73.9 HYPERGLYCEMIA: ICD-10-CM

## 2023-11-22 DIAGNOSIS — R30.0 DYSURIA: Primary | ICD-10-CM

## 2023-11-22 DIAGNOSIS — N30.00 ACUTE CYSTITIS WITHOUT HEMATURIA: ICD-10-CM

## 2023-11-22 LAB
ALBUMIN SERPL-MCNC: 3.8 G/DL (ref 3.5–5.2)
ALBUMIN/GLOB SERPL: 1.1 {RATIO} (ref 1–2.5)
ALP SERPL-CCNC: 200 U/L (ref 40–129)
ALT SERPL-CCNC: 15 U/L (ref 5–41)
ANION GAP SERPL CALCULATED.3IONS-SCNC: 9 MMOL/L (ref 9–17)
AST SERPL-CCNC: 11 U/L
BACTERIA URNS QL MICRO: ABNORMAL
BASOPHILS # BLD: 0.07 K/UL (ref 0–0.2)
BASOPHILS NFR BLD: 1 % (ref 0–2)
BILIRUB SERPL-MCNC: 0.4 MG/DL (ref 0.3–1.2)
BILIRUB UR QL STRIP: NEGATIVE
BUN SERPL-MCNC: 16 MG/DL (ref 8–23)
BUN/CREAT SERPL: 16 (ref 9–20)
CALCIUM SERPL-MCNC: 9.2 MG/DL (ref 8.6–10.4)
CHARACTER UR: ABNORMAL
CHLORIDE SERPL-SCNC: 94 MMOL/L (ref 98–107)
CHP ED QC CHECK: NORMAL
CLARITY UR: CLEAR
CO2 SERPL-SCNC: 30 MMOL/L (ref 20–31)
COLOR UR: YELLOW
CREAT SERPL-MCNC: 1 MG/DL (ref 0.7–1.2)
EOSINOPHIL # BLD: 0.21 K/UL (ref 0–0.44)
EOSINOPHILS RELATIVE PERCENT: 2 % (ref 1–4)
EPI CELLS #/AREA URNS HPF: ABNORMAL /HPF (ref 0–5)
ERYTHROCYTE [DISTWIDTH] IN BLOOD BY AUTOMATED COUNT: 12.6 % (ref 11.8–14.4)
GFR SERPL CREATININE-BSD FRML MDRD: >60 ML/MIN/1.73M2
GLUCOSE BLD-MCNC: 367 MG/DL
GLUCOSE BLD-MCNC: 367 MG/DL (ref 75–110)
GLUCOSE BLD-MCNC: 419 MG/DL (ref 75–110)
GLUCOSE SERPL-MCNC: 432 MG/DL (ref 70–99)
GLUCOSE UR STRIP-MCNC: ABNORMAL MG/DL
HCT VFR BLD AUTO: 45.4 % (ref 40.7–50.3)
HGB BLD-MCNC: 15.2 G/DL (ref 13–17)
HGB UR QL STRIP.AUTO: ABNORMAL
IMM GRANULOCYTES # BLD AUTO: 0.04 K/UL (ref 0–0.3)
IMM GRANULOCYTES NFR BLD: 0 %
KETONES UR STRIP-MCNC: NEGATIVE MG/DL
LACTATE BLDV-SCNC: 1.1 MMOL/L (ref 0.5–2.2)
LEUKOCYTE ESTERASE UR QL STRIP: ABNORMAL
LYMPHOCYTES NFR BLD: 1.89 K/UL (ref 1.1–3.7)
LYMPHOCYTES RELATIVE PERCENT: 17 % (ref 24–43)
MCH RBC QN AUTO: 29.1 PG (ref 25.2–33.5)
MCHC RBC AUTO-ENTMCNC: 33.5 G/DL (ref 25.2–33.5)
MCV RBC AUTO: 86.8 FL (ref 82.6–102.9)
MONOCYTES NFR BLD: 0.99 K/UL (ref 0.1–1.2)
MONOCYTES NFR BLD: 9 % (ref 3–12)
NEUTROPHILS NFR BLD: 71 % (ref 36–65)
NEUTS SEG NFR BLD: 8.18 K/UL (ref 1.5–8.1)
NITRITE UR QL STRIP: POSITIVE
NRBC BLD-RTO: 0 PER 100 WBC
PH UR STRIP: 6 [PH] (ref 5–6)
PLATELET # BLD AUTO: 276 K/UL (ref 138–453)
PMV BLD AUTO: 10.4 FL (ref 8.1–13.5)
POTASSIUM SERPL-SCNC: 4 MMOL/L (ref 3.7–5.3)
PROT SERPL-MCNC: 7.2 G/DL (ref 6.4–8.3)
PROT UR STRIP-MCNC: NEGATIVE MG/DL
RBC # BLD AUTO: 5.23 M/UL (ref 4.21–5.77)
RBC #/AREA URNS HPF: ABNORMAL /HPF (ref 0–4)
SODIUM SERPL-SCNC: 133 MMOL/L (ref 135–144)
SP GR UR STRIP: 1 (ref 1.01–1.02)
UROBILINOGEN UR STRIP-ACNC: NORMAL EU/DL (ref 0–1)
WBC #/AREA URNS HPF: ABNORMAL /HPF (ref 0–4)
WBC OTHER # BLD: 11.4 K/UL (ref 3.5–11.3)
YEAST URNS QL MICRO: ABNORMAL

## 2023-11-22 PROCEDURE — 6360000002 HC RX W HCPCS: Performed by: EMERGENCY MEDICINE

## 2023-11-22 PROCEDURE — 51702 INSERT TEMP BLADDER CATH: CPT

## 2023-11-22 PROCEDURE — 99284 EMERGENCY DEPT VISIT MOD MDM: CPT

## 2023-11-22 PROCEDURE — 51798 US URINE CAPACITY MEASURE: CPT

## 2023-11-22 PROCEDURE — 80053 COMPREHEN METABOLIC PANEL: CPT

## 2023-11-22 PROCEDURE — 6370000000 HC RX 637 (ALT 250 FOR IP): Performed by: EMERGENCY MEDICINE

## 2023-11-22 PROCEDURE — 99999 PR OFFICE/OUTPT VISIT,PROCEDURE ONLY: CPT | Performed by: NURSE PRACTITIONER

## 2023-11-22 PROCEDURE — 96365 THER/PROPH/DIAG IV INF INIT: CPT

## 2023-11-22 PROCEDURE — 83605 ASSAY OF LACTIC ACID: CPT

## 2023-11-22 PROCEDURE — 85025 COMPLETE CBC W/AUTO DIFF WBC: CPT

## 2023-11-22 PROCEDURE — 2580000003 HC RX 258: Performed by: EMERGENCY MEDICINE

## 2023-11-22 PROCEDURE — 96372 THER/PROPH/DIAG INJ SC/IM: CPT

## 2023-11-22 PROCEDURE — 81001 URINALYSIS AUTO W/SCOPE: CPT

## 2023-11-22 PROCEDURE — 36415 COLL VENOUS BLD VENIPUNCTURE: CPT

## 2023-11-22 PROCEDURE — 82947 ASSAY GLUCOSE BLOOD QUANT: CPT

## 2023-11-22 PROCEDURE — 87086 URINE CULTURE/COLONY COUNT: CPT

## 2023-11-22 RX ORDER — TAMSULOSIN HYDROCHLORIDE 0.4 MG/1
0.4 CAPSULE ORAL DAILY
Qty: 7 CAPSULE | Refills: 0 | Status: SHIPPED | OUTPATIENT
Start: 2023-11-22 | End: 2023-11-29

## 2023-11-22 RX ORDER — GLUCOSAMINE HCL/CHONDROITIN SU 500-400 MG
1 CAPSULE ORAL PRN
Qty: 100 EACH | Refills: 0 | Status: SHIPPED | OUTPATIENT
Start: 2023-11-22

## 2023-11-22 RX ORDER — FLUCONAZOLE 150 MG/1
150 TABLET ORAL ONCE
Status: COMPLETED | OUTPATIENT
Start: 2023-11-22 | End: 2023-11-22

## 2023-11-22 RX ORDER — 0.9 % SODIUM CHLORIDE 0.9 %
1000 INTRAVENOUS SOLUTION INTRAVENOUS ONCE
Status: COMPLETED | OUTPATIENT
Start: 2023-11-22 | End: 2023-11-22

## 2023-11-22 RX ORDER — PEN NEEDLE, DIABETIC 32GX 5/32"
1 NEEDLE, DISPOSABLE MISCELLANEOUS DAILY
Qty: 100 EACH | Refills: 0 | Status: SHIPPED | OUTPATIENT
Start: 2023-11-22

## 2023-11-22 RX ORDER — FLUCONAZOLE 100 MG/1
200 TABLET ORAL DAILY
Qty: 28 TABLET | Refills: 0 | Status: SHIPPED | OUTPATIENT
Start: 2023-11-22 | End: 2023-12-06

## 2023-11-22 RX ORDER — CEPHALEXIN 500 MG/1
500 CAPSULE ORAL 4 TIMES DAILY
Qty: 40 CAPSULE | Refills: 0 | Status: SHIPPED | OUTPATIENT
Start: 2023-11-22 | End: 2023-12-02

## 2023-11-22 RX ORDER — INSULIN GLARGINE 100 [IU]/ML
8 INJECTION, SOLUTION SUBCUTANEOUS ONCE
Status: COMPLETED | OUTPATIENT
Start: 2023-11-22 | End: 2023-11-22

## 2023-11-22 RX ORDER — IBUPROFEN 600 MG/1
1 TABLET ORAL ONCE
Qty: 1 MG | Refills: 0 | Status: SHIPPED | OUTPATIENT
Start: 2023-11-22 | End: 2023-11-22

## 2023-11-22 RX ADMIN — CEFTRIAXONE 1000 MG: 1 INJECTION, POWDER, FOR SOLUTION INTRAMUSCULAR; INTRAVENOUS at 20:51

## 2023-11-22 RX ADMIN — INSULIN GLARGINE 8 UNITS: 100 INJECTION, SOLUTION SUBCUTANEOUS at 21:21

## 2023-11-22 RX ADMIN — FLUCONAZOLE 150 MG: 150 TABLET ORAL at 21:18

## 2023-11-22 RX ADMIN — SODIUM CHLORIDE 1000 ML: 9 INJECTION, SOLUTION INTRAVENOUS at 21:12

## 2023-11-22 RX ADMIN — SODIUM CHLORIDE 1000 ML: 9 INJECTION, SOLUTION INTRAVENOUS at 19:40

## 2023-11-22 ASSESSMENT — PAIN - FUNCTIONAL ASSESSMENT: PAIN_FUNCTIONAL_ASSESSMENT: NONE - DENIES PAIN

## 2023-11-23 ASSESSMENT — ENCOUNTER SYMPTOMS
SHORTNESS OF BREATH: 0
VOMITING: 0
NAUSEA: 0
ABDOMINAL PAIN: 0

## 2023-11-23 NOTE — PROGRESS NOTES
Patient was wheeled to ER via wheel chair for further evaluation spoke with kristel ZAMORA to give report

## 2023-11-23 NOTE — ED PROVIDER NOTES
[facility] ED  [address]  Phone: [phone]  eMERGENCY dEPARTMENT eNCOUnter      Pt Name: Jesus Allen  MRN: [id number]  [address] [facility] 1955  Date of evaluation: 11/23/23      CHIEF COMPLAINT     Chief Complaint   Patient presents with    Urinary Retention       HISTORY OF PRESENT ILLNESS    Jesus Allen is a 79 y.o. male who presents today being referred from urgent care for intermittent difficulty with urination and dysuria. Patient was admitted for UTI within the last few months. He also has a history of diabetes admits that he has not been taking his insulin as he had difficulty getting it filled. Patient denies chest pain or difficulty breathing. He denies associated fever. Has not had nausea or vomiting. REVIEW OF SYSTEMS       Review of Systems   Constitutional:  Negative for fever. Respiratory:  Negative for shortness of breath. Cardiovascular:  Negative for chest pain. Gastrointestinal:  Negative for abdominal pain, nausea and vomiting. Genitourinary:  Positive for difficulty urinating and dysuria. Musculoskeletal:         Chronic back pain. Skin:  Negative for rash. Neurological:  Negative for syncope. All other systems reviewed and are negative. PAST MEDICAL HISTORY    has a past medical history of Asthma, Cat bite of left hand, Cellulitis of hand, Chronic back pain, COPD (chronic obstructive pulmonary disease) ([address]), DDD (degenerative disc disease), Diabetes mellitus ([address]), Hyperlipidemia, Left knee pain, Lumbago, Lumbar disc displacement without myelopathy, Sepsis ([address]), and Thoracic or lumbosacral neuritis or radiculitis, unspecified. SURGICAL HISTORY      has a past surgical history that includes Total knee arthroplasty (Left, 2009) and Vasectomy.     CURRENT MEDICATIONS       Discharge Medication List as of 11/22/2023 10:06 PM        CONTINUE these medications which have NOT CHANGED    Details   !! DROPLET PEN NEEDLES

## 2023-11-23 NOTE — ED TRIAGE NOTES
Pt arrives from urgent care with urinary retention and incontinence for 1 month. Non complaint with meds and checked BS today and it was 461 approx 1 hour PTA.

## 2023-11-23 NOTE — DISCHARGE INSTRUCTIONS
Please follow-up with your PCP within 2 days and urology within 1 weeks. Please call for appointments. Medications as prescribed. Return to the ED for blood sugars over 400, fevers, nausea / vomiting, not draining 200ml of urine every 4 hours, or any other acute concerns. Please call your PCP to follow-up with urine culture results as you may required adjustments to your antibiotics.

## 2023-11-24 LAB
MICROORGANISM SPEC CULT: NO GROWTH
SPECIMEN DESCRIPTION: NORMAL

## 2023-12-06 ENCOUNTER — OFFICE VISIT (OUTPATIENT)
Dept: UROLOGY | Age: 68
End: 2023-12-06
Payer: MEDICARE

## 2023-12-06 VITALS
HEIGHT: 67 IN | HEART RATE: 67 BPM | DIASTOLIC BLOOD PRESSURE: 82 MMHG | SYSTOLIC BLOOD PRESSURE: 130 MMHG | OXYGEN SATURATION: 96 % | BODY MASS INDEX: 24.2 KG/M2 | WEIGHT: 154.2 LBS

## 2023-12-06 DIAGNOSIS — N40.1 BPH WITH OBSTRUCTION/LOWER URINARY TRACT SYMPTOMS: ICD-10-CM

## 2023-12-06 DIAGNOSIS — N13.8 BPH WITH OBSTRUCTION/LOWER URINARY TRACT SYMPTOMS: ICD-10-CM

## 2023-12-06 DIAGNOSIS — R33.9 URINARY RETENTION: Primary | ICD-10-CM

## 2023-12-06 PROCEDURE — G8427 DOCREV CUR MEDS BY ELIG CLIN: HCPCS | Performed by: UROLOGY

## 2023-12-06 PROCEDURE — G8420 CALC BMI NORM PARAMETERS: HCPCS | Performed by: UROLOGY

## 2023-12-06 PROCEDURE — 99214 OFFICE O/P EST MOD 30 MIN: CPT | Performed by: UROLOGY

## 2023-12-06 PROCEDURE — G8484 FLU IMMUNIZE NO ADMIN: HCPCS | Performed by: UROLOGY

## 2023-12-06 PROCEDURE — 99204 OFFICE O/P NEW MOD 45 MIN: CPT | Performed by: UROLOGY

## 2023-12-06 PROCEDURE — 3017F COLORECTAL CA SCREEN DOC REV: CPT | Performed by: UROLOGY

## 2023-12-06 PROCEDURE — 1123F ACP DISCUSS/DSCN MKR DOCD: CPT | Performed by: UROLOGY

## 2023-12-06 PROCEDURE — 4004F PT TOBACCO SCREEN RCVD TLK: CPT | Performed by: UROLOGY

## 2023-12-06 RX ORDER — TAMSULOSIN HYDROCHLORIDE 0.4 MG/1
0.4 CAPSULE ORAL DAILY
Qty: 90 CAPSULE | Refills: 3 | Status: SHIPPED | OUTPATIENT
Start: 2023-12-06

## 2023-12-06 NOTE — PROGRESS NOTES
Cristel Quiñonez MD   Urology Clinic office Visit      Patient:  Ang Ayala  YOB: 1955  Date: 12/6/2023    HISTORY OF PRESENT ILLNESS:   The patient is a 79 y.o. male who presents today for evaluation of the following problem(s):      1. Urinary retention    2. BPH with obstruction/lower urinary tract symptoms           Overall the problem(s) : are worsening. Associated Symptoms: No dysuria, gross hematuria. Pain Severity:      Summary of old records: N/A  (Patient's old records, notes and chart reviewed and summarized above.)      Onset 1 month over thanksgiving  Severity is described as urinary retention  Managed with álvarez. The course of symptoms over time is 1 month. Alleviating factors: álvarez  Unsure if taking flomax  Worsening factors: none  Lower urinary tract symptoms: reports weak stream prior to this episode  Diabetic  Not compliant    I independently reviewed and verified the images and reports from:    No results found. Last several PSA's:  No results found for: \"PSA\"    Last total testosterone:  No results found for: \"TESTOSTERONE\"    Urinalysis today:  No results found for this visit on 12/06/23.       Last BUN and creatinine:  Lab Results   Component Value Date    BUN 16 11/22/2023     Lab Results   Component Value Date    CREATININE 1.0 11/22/2023       Imaging Reviewed during this Office Visit:   (results were independently reviewed by physician and radiology report verified)    PAST MEDICAL, FAMILY AND SOCIAL HISTORY:  Past Medical History:   Diagnosis Date    Asthma     Cat bite of left hand 09/21/2015    Cellulitis of hand     Chronic back pain     COPD (chronic obstructive pulmonary disease) ([address])     DDD (degenerative disc disease)     Diabetes mellitus ([address])     Hyperlipidemia     Left knee pain     Lumbago     Lumbar disc displacement without myelopathy     [facility]    Sepsis ([address]) 10/3/2022    Thoracic or lumbosacral neuritis or radiculitis, unspecified     [facility]

## 2023-12-27 ENCOUNTER — HOSPITAL ENCOUNTER (OUTPATIENT)
Age: 68
Setting detail: SPECIMEN
Discharge: HOME | End: 2023-12-27
Payer: MEDICARE

## 2023-12-27 ENCOUNTER — PROCEDURE VISIT (OUTPATIENT)
Dept: UROLOGY | Age: 68
End: 2023-12-27
Payer: MEDICARE

## 2023-12-27 VITALS
BODY MASS INDEX: 24.17 KG/M2 | DIASTOLIC BLOOD PRESSURE: 60 MMHG | HEIGHT: 67 IN | SYSTOLIC BLOOD PRESSURE: 118 MMHG | WEIGHT: 154 LBS | HEART RATE: 76 BPM

## 2023-12-27 DIAGNOSIS — R33.9 URINARY RETENTION: ICD-10-CM

## 2023-12-27 DIAGNOSIS — N40.1 BPH WITH OBSTRUCTION/LOWER URINARY TRACT SYMPTOMS: Primary | ICD-10-CM

## 2023-12-27 DIAGNOSIS — N13.8 BPH WITH OBSTRUCTION/LOWER URINARY TRACT SYMPTOMS: Primary | ICD-10-CM

## 2023-12-27 LAB — POST VOID RESIDUAL (PVR): 280 ML

## 2023-12-27 PROCEDURE — 51798 US URINE CAPACITY MEASURE: CPT | Performed by: UROLOGY

## 2023-12-27 PROCEDURE — 87106 FUNGI IDENTIFICATION YEAST: CPT

## 2023-12-27 PROCEDURE — PBSHW PR MEAS POST-VOIDING RESIDUAL URINE&/BLADDER CAP: Performed by: UROLOGY

## 2023-12-27 PROCEDURE — PBSHW PBB SHADOW CHARGE: Performed by: UROLOGY

## 2023-12-27 PROCEDURE — 3017F COLORECTAL CA SCREEN DOC REV: CPT | Performed by: UROLOGY

## 2023-12-27 PROCEDURE — 52000 CYSTOURETHROSCOPY: CPT | Performed by: UROLOGY

## 2023-12-27 PROCEDURE — G8420 CALC BMI NORM PARAMETERS: HCPCS | Performed by: UROLOGY

## 2023-12-27 PROCEDURE — 99214 OFFICE O/P EST MOD 30 MIN: CPT | Performed by: UROLOGY

## 2023-12-27 PROCEDURE — 1123F ACP DISCUSS/DSCN MKR DOCD: CPT | Performed by: UROLOGY

## 2023-12-27 PROCEDURE — G8427 DOCREV CUR MEDS BY ELIG CLIN: HCPCS | Performed by: UROLOGY

## 2023-12-27 PROCEDURE — 4004F PT TOBACCO SCREEN RCVD TLK: CPT | Performed by: UROLOGY

## 2023-12-27 PROCEDURE — 87086 URINE CULTURE/COLONY COUNT: CPT

## 2023-12-27 PROCEDURE — G8484 FLU IMMUNIZE NO ADMIN: HCPCS | Performed by: UROLOGY

## 2023-12-27 RX ORDER — TAMSULOSIN HYDROCHLORIDE 0.4 MG/1
0.4 CAPSULE ORAL DAILY
Qty: 30 CAPSULE | Refills: 2 | Status: SHIPPED | OUTPATIENT
Start: 2023-12-27 | End: 2024-01-26

## 2023-12-27 RX ORDER — CIPROFLOXACIN 500 MG/1
500 TABLET, FILM COATED ORAL 2 TIMES DAILY
Qty: 14 TABLET | Refills: 0 | Status: SHIPPED | OUTPATIENT
Start: 2023-12-27

## 2023-12-27 NOTE — PROGRESS NOTES
Post void residual by bladder scanner 280cc. [address] E number [id number]; Serial Number [id number] scope #1 used for procedure today, was removed from sterile container after visual inspection.
Smokeless Tobacco Never       Social History     Substance and Sexual Activity   Alcohol Use Not Currently    Comment: history of, sober since 2012       REVIEW OF SYSTEMS:  Constitutional: negative  Eyes: negative  Respiratory: negative  Cardiovascular: negative  Gastrointestinal: negative  Musculoskeletal: negative  Genitourinary: negative except for what is in HPI  Skin: negative   Neurological: negative  Hematological/Lymphatic: negative  Psychological: negative    Physical Exam:    This a 79 y.o. male   Vitals:    12/27/23 0820   BP: 118/60   Pulse: 76       Cystoscopy Operative Note    Findings:   The patient was prepped and draped in the usual sterile fashion. The flexible cystoscope was advanced through the urethra and into the bladder. The bladder was thoroughly inspected and the following was noted:    Residual Urine: Moderate   Urethra: No abnormalities of the urethra are noted. Prostate: obstructive prostate  Bladder: cloudy urine  Ureters: Clear efflux from both ureters. Orifices with normal configuration and location. The cystoscope was removed. The patient tolerated the procedure well. Plan: Good candidate for LOPEZ surgery        Assessment and Plan      1. BPH with obstruction/lower urinary tract symptoms    2.  Urinary retention           Removed last visit - has not has issues since  Flomax daily- says he is taking    Today cysto as above  Check PSA- in the future        280 cc PVR today  Check Ucx  Cipro Rx        Fu 1 month with [facility] for PVR  May or may not need greenlight PVP      Prescriptions Ordered:  Orders Placed This Encounter   Medications    ciprofloxacin (CIPRO) 500 MG tablet     Sig: Take 1 tablet by mouth 2 times daily     Dispense:  14 tablet     Refill:  0    tamsulosin (FLOMAX) 0.4 MG capsule     Sig: Take 1 capsule by mouth daily for 30 doses     Dispense:  30 capsule     Refill:  2      Orders Placed:  Orders Placed This Encounter   Procedures    Culture, Urine

## 2023-12-29 LAB
MICROORGANISM SPEC CULT: ABNORMAL
SPECIMEN DESCRIPTION: ABNORMAL

## 2024-08-29 ENCOUNTER — TELEPHONE (OUTPATIENT)
Dept: INTERNAL MEDICINE | Age: 69
End: 2024-08-29

## 2024-08-29 NOTE — TELEPHONE ENCOUNTER
Last visit with Becka was 10/10/22.    Attempted to call pt x 2 ([phone]) to schedule an AWV/Annual Physical, but unsuccessful. Phone immediately rings busy both times.  Green card mailed to pt requesting he call our office to schedule.